# Patient Record
Sex: FEMALE | Race: WHITE | NOT HISPANIC OR LATINO
[De-identification: names, ages, dates, MRNs, and addresses within clinical notes are randomized per-mention and may not be internally consistent; named-entity substitution may affect disease eponyms.]

---

## 2017-06-09 ENCOUNTER — APPOINTMENT (OUTPATIENT)
Dept: ORTHOPEDIC SURGERY | Facility: CLINIC | Age: 72
End: 2017-06-09

## 2017-06-09 VITALS
HEIGHT: 60 IN | HEART RATE: 90 BPM | SYSTOLIC BLOOD PRESSURE: 155 MMHG | DIASTOLIC BLOOD PRESSURE: 87 MMHG | WEIGHT: 195 LBS | BODY MASS INDEX: 38.28 KG/M2

## 2017-06-09 RX ORDER — BENZONATATE 100 MG/1
100 CAPSULE ORAL
Qty: 21 | Refills: 0 | Status: ACTIVE | COMMUNITY
Start: 2017-03-25

## 2017-06-09 RX ORDER — AZITHROMYCIN 250 MG/1
250 TABLET, FILM COATED ORAL
Qty: 6 | Refills: 0 | Status: ACTIVE | COMMUNITY
Start: 2017-03-25

## 2017-06-09 RX ORDER — BENZONATATE 200 MG/1
200 CAPSULE ORAL
Qty: 21 | Refills: 0 | Status: ACTIVE | COMMUNITY
Start: 2017-03-21

## 2017-06-09 RX ORDER — FLUTICASONE PROPIONATE 50 UG/1
50 SPRAY, METERED NASAL
Qty: 16 | Refills: 0 | Status: ACTIVE | COMMUNITY
Start: 2017-03-21

## 2017-07-07 ENCOUNTER — APPOINTMENT (OUTPATIENT)
Dept: ORTHOPEDIC SURGERY | Facility: CLINIC | Age: 72
End: 2017-07-07

## 2017-07-07 VITALS
HEART RATE: 82 BPM | WEIGHT: 195 LBS | HEIGHT: 60 IN | DIASTOLIC BLOOD PRESSURE: 87 MMHG | SYSTOLIC BLOOD PRESSURE: 149 MMHG | BODY MASS INDEX: 38.28 KG/M2

## 2017-09-08 ENCOUNTER — APPOINTMENT (OUTPATIENT)
Dept: ORTHOPEDIC SURGERY | Facility: CLINIC | Age: 72
End: 2017-09-08
Payer: MEDICARE

## 2017-09-08 VITALS
BODY MASS INDEX: 38.28 KG/M2 | DIASTOLIC BLOOD PRESSURE: 90 MMHG | SYSTOLIC BLOOD PRESSURE: 154 MMHG | HEART RATE: 96 BPM | HEIGHT: 60 IN | WEIGHT: 195 LBS

## 2017-09-08 DIAGNOSIS — M17.12 UNILATERAL PRIMARY OSTEOARTHRITIS, LEFT KNEE: ICD-10-CM

## 2017-09-08 PROCEDURE — 99214 OFFICE O/P EST MOD 30 MIN: CPT | Mod: 25

## 2017-09-08 PROCEDURE — 20610 DRAIN/INJ JOINT/BURSA W/O US: CPT | Mod: LT

## 2018-01-19 ENCOUNTER — APPOINTMENT (OUTPATIENT)
Dept: ORTHOPEDIC SURGERY | Facility: CLINIC | Age: 73
End: 2018-01-19

## 2018-06-08 ENCOUNTER — APPOINTMENT (OUTPATIENT)
Dept: ORTHOPEDIC SURGERY | Facility: CLINIC | Age: 73
End: 2018-06-08

## 2018-06-29 PROBLEM — M25.572 LEFT ANKLE PAIN, UNSPECIFIED CHRONICITY: Status: ACTIVE | Noted: 2018-06-29

## 2018-07-06 ENCOUNTER — APPOINTMENT (OUTPATIENT)
Dept: ORTHOPEDIC SURGERY | Facility: CLINIC | Age: 73
End: 2018-07-06
Payer: MEDICARE

## 2018-07-06 VITALS
HEART RATE: 68 BPM | WEIGHT: 195 LBS | BODY MASS INDEX: 38.28 KG/M2 | SYSTOLIC BLOOD PRESSURE: 156 MMHG | HEIGHT: 60 IN | DIASTOLIC BLOOD PRESSURE: 88 MMHG

## 2018-07-06 DIAGNOSIS — M25.572 PAIN IN LEFT ANKLE AND JOINTS OF LEFT FOOT: ICD-10-CM

## 2018-07-06 DIAGNOSIS — M25.562 PAIN IN LEFT KNEE: ICD-10-CM

## 2018-07-06 PROCEDURE — 73562 X-RAY EXAM OF KNEE 3: CPT | Mod: LT

## 2018-07-06 PROCEDURE — 99214 OFFICE O/P EST MOD 30 MIN: CPT

## 2018-07-06 PROCEDURE — 73610 X-RAY EXAM OF ANKLE: CPT | Mod: RT

## 2018-07-10 ENCOUNTER — OUTPATIENT (OUTPATIENT)
Dept: OUTPATIENT SERVICES | Facility: HOSPITAL | Age: 73
LOS: 1 days | End: 2018-07-10
Payer: MEDICARE

## 2018-07-10 VITALS
OXYGEN SATURATION: 96 % | TEMPERATURE: 98 F | HEART RATE: 58 BPM | RESPIRATION RATE: 18 BRPM | DIASTOLIC BLOOD PRESSURE: 76 MMHG | SYSTOLIC BLOOD PRESSURE: 143 MMHG | WEIGHT: 212.08 LBS | HEIGHT: 60 IN

## 2018-07-10 DIAGNOSIS — Z01.818 ENCOUNTER FOR OTHER PREPROCEDURAL EXAMINATION: ICD-10-CM

## 2018-07-10 DIAGNOSIS — Z98.84 BARIATRIC SURGERY STATUS: Chronic | ICD-10-CM

## 2018-07-10 DIAGNOSIS — Z96.661 PRESENCE OF RIGHT ARTIFICIAL ANKLE JOINT: Chronic | ICD-10-CM

## 2018-07-10 DIAGNOSIS — M17.12 UNILATERAL PRIMARY OSTEOARTHRITIS, LEFT KNEE: ICD-10-CM

## 2018-07-10 DIAGNOSIS — Z90.89 ACQUIRED ABSENCE OF OTHER ORGANS: Chronic | ICD-10-CM

## 2018-07-10 LAB
ALBUMIN SERPL ELPH-MCNC: 3.9 G/DL — SIGNIFICANT CHANGE UP (ref 3.3–5)
ALP SERPL-CCNC: 81 U/L — SIGNIFICANT CHANGE UP (ref 30–120)
ALT FLD-CCNC: 29 U/L DA — SIGNIFICANT CHANGE UP (ref 10–60)
ANION GAP SERPL CALC-SCNC: 7 MMOL/L — SIGNIFICANT CHANGE UP (ref 5–17)
APTT BLD: 37.5 SEC — HIGH (ref 27.5–37.4)
AST SERPL-CCNC: 22 U/L — SIGNIFICANT CHANGE UP (ref 10–40)
BILIRUB SERPL-MCNC: 0.5 MG/DL — SIGNIFICANT CHANGE UP (ref 0.2–1.2)
BLD GP AB SCN SERPL QL: SIGNIFICANT CHANGE UP
BUN SERPL-MCNC: 21 MG/DL — SIGNIFICANT CHANGE UP (ref 7–23)
CALCIUM SERPL-MCNC: 9.8 MG/DL — SIGNIFICANT CHANGE UP (ref 8.4–10.5)
CHLORIDE SERPL-SCNC: 105 MMOL/L — SIGNIFICANT CHANGE UP (ref 96–108)
CO2 SERPL-SCNC: 27 MMOL/L — SIGNIFICANT CHANGE UP (ref 22–31)
CREAT SERPL-MCNC: 0.97 MG/DL — SIGNIFICANT CHANGE UP (ref 0.5–1.3)
GLUCOSE SERPL-MCNC: 107 MG/DL — HIGH (ref 70–99)
HCT VFR BLD CALC: 42.5 % — SIGNIFICANT CHANGE UP (ref 34.5–45)
HGB BLD-MCNC: 14.8 G/DL — SIGNIFICANT CHANGE UP (ref 11.5–15.5)
INR BLD: 1.03 RATIO — SIGNIFICANT CHANGE UP (ref 0.88–1.16)
MCHC RBC-ENTMCNC: 30.4 PG — SIGNIFICANT CHANGE UP (ref 27–34)
MCHC RBC-ENTMCNC: 34.8 GM/DL — SIGNIFICANT CHANGE UP (ref 32–36)
MCV RBC AUTO: 87.3 FL — SIGNIFICANT CHANGE UP (ref 80–100)
MRSA PCR RESULT.: SIGNIFICANT CHANGE UP
NRBC # BLD: 0 /100 WBCS — SIGNIFICANT CHANGE UP (ref 0–0)
PLATELET # BLD AUTO: 171 K/UL — SIGNIFICANT CHANGE UP (ref 150–400)
POTASSIUM SERPL-MCNC: 4.5 MMOL/L — SIGNIFICANT CHANGE UP (ref 3.5–5.3)
POTASSIUM SERPL-SCNC: 4.5 MMOL/L — SIGNIFICANT CHANGE UP (ref 3.5–5.3)
PROT SERPL-MCNC: 7.3 G/DL — SIGNIFICANT CHANGE UP (ref 6–8.3)
PROTHROM AB SERPL-ACNC: 11.2 SEC — SIGNIFICANT CHANGE UP (ref 9.8–12.7)
RBC # BLD: 4.87 M/UL — SIGNIFICANT CHANGE UP (ref 3.8–5.2)
RBC # FLD: 11.9 % — SIGNIFICANT CHANGE UP (ref 10.3–14.5)
S AUREUS DNA NOSE QL NAA+PROBE: SIGNIFICANT CHANGE UP
SODIUM SERPL-SCNC: 139 MMOL/L — SIGNIFICANT CHANGE UP (ref 135–145)
WBC # BLD: 6.44 K/UL — SIGNIFICANT CHANGE UP (ref 3.8–10.5)
WBC # FLD AUTO: 6.44 K/UL — SIGNIFICANT CHANGE UP (ref 3.8–10.5)

## 2018-07-10 PROCEDURE — 93010 ELECTROCARDIOGRAM REPORT: CPT | Mod: NC

## 2018-07-10 PROCEDURE — 93005 ELECTROCARDIOGRAM TRACING: CPT

## 2018-07-10 PROCEDURE — G0463: CPT

## 2018-07-10 PROCEDURE — 86901 BLOOD TYPING SEROLOGIC RH(D): CPT

## 2018-07-10 PROCEDURE — 85610 PROTHROMBIN TIME: CPT

## 2018-07-10 PROCEDURE — 80053 COMPREHEN METABOLIC PANEL: CPT

## 2018-07-10 PROCEDURE — 87640 STAPH A DNA AMP PROBE: CPT

## 2018-07-10 PROCEDURE — 86900 BLOOD TYPING SEROLOGIC ABO: CPT

## 2018-07-10 PROCEDURE — 86850 RBC ANTIBODY SCREEN: CPT

## 2018-07-10 PROCEDURE — 87641 MR-STAPH DNA AMP PROBE: CPT

## 2018-07-10 PROCEDURE — 85027 COMPLETE CBC AUTOMATED: CPT

## 2018-07-10 PROCEDURE — 85730 THROMBOPLASTIN TIME PARTIAL: CPT

## 2018-07-10 NOTE — H&P PST ADULT - HISTORY OF PRESENT ILLNESS
71 y/o female with h/o osteoarthritis present with c/o left knee pain and decreased range of motion. Patient reports pain level 10/10 on painscale with activities. Denies injury or trauma. Denies numbness or tingling, loss of sensation or weakness. Tried pain medication, gel and steroid injections with no relief. Doctor advised surgery. Patient here for PST in [facility].

## 2018-07-10 NOTE — H&P PST ADULT - PSH
History of laparoscopic adjustable gastric banding  s/p removal 8/2016  History of tonsillectomy  childhood  Personal history of gastric banding  2008  S/P ankle joint replacement, right  2012

## 2018-07-10 NOTE — H&P PST ADULT - NSANTHOSAYNRD_GEN_A_CORE
No. TORI screening performed.  STOP BANG Legend: 0-2 = LOW Risk; 3-4 = INTERMEDIATE Risk; 5-8 = HIGH Risk

## 2018-07-10 NOTE — H&P PST ADULT - FAMILY HISTORY
Mother  Still living? Unknown  Family history of lung cancer, Age at diagnosis: Age Unknown     Sibling  Still living? No  Family history of lung cancer, Age at diagnosis: Age Unknown  No family history of COPD, Age at diagnosis: Age Unknown

## 2018-07-17 PROBLEM — M17.12 UNILATERAL PRIMARY OSTEOARTHRITIS, LEFT KNEE: Chronic | Status: ACTIVE | Noted: 2018-07-10

## 2018-07-19 ENCOUNTER — INPATIENT (INPATIENT)
Facility: HOSPITAL | Age: 73
LOS: 1 days | Discharge: HOME | DRG: 470 | End: 2018-07-21
Attending: ORTHOPAEDIC SURGERY | Admitting: ORTHOPAEDIC SURGERY
Payer: MEDICARE

## 2018-07-19 ENCOUNTER — RESULT REVIEW (OUTPATIENT)
Age: 73
End: 2018-07-19

## 2018-07-19 ENCOUNTER — APPOINTMENT (OUTPATIENT)
Dept: ORTHOPEDIC SURGERY | Facility: HOSPITAL | Age: 73
End: 2018-07-19

## 2018-07-19 VITALS
WEIGHT: 211.2 LBS | HEIGHT: 60 IN | SYSTOLIC BLOOD PRESSURE: 137 MMHG | RESPIRATION RATE: 18 BRPM | OXYGEN SATURATION: 97 % | TEMPERATURE: 98 F | HEART RATE: 75 BPM | DIASTOLIC BLOOD PRESSURE: 64 MMHG

## 2018-07-19 DIAGNOSIS — Z98.84 BARIATRIC SURGERY STATUS: Chronic | ICD-10-CM

## 2018-07-19 DIAGNOSIS — M17.12 UNILATERAL PRIMARY OSTEOARTHRITIS, LEFT KNEE: ICD-10-CM

## 2018-07-19 DIAGNOSIS — Z96.661 PRESENCE OF RIGHT ARTIFICIAL ANKLE JOINT: Chronic | ICD-10-CM

## 2018-07-19 DIAGNOSIS — Z90.89 ACQUIRED ABSENCE OF OTHER ORGANS: Chronic | ICD-10-CM

## 2018-07-19 DIAGNOSIS — Z01.818 ENCOUNTER FOR OTHER PREPROCEDURAL EXAMINATION: ICD-10-CM

## 2018-07-19 LAB
ANION GAP SERPL CALC-SCNC: 8 MMOL/L — SIGNIFICANT CHANGE UP (ref 5–17)
BUN SERPL-MCNC: 27 MG/DL — HIGH (ref 7–23)
CALCIUM SERPL-MCNC: 9.1 MG/DL — SIGNIFICANT CHANGE UP (ref 8.4–10.5)
CHLORIDE SERPL-SCNC: 103 MMOL/L — SIGNIFICANT CHANGE UP (ref 96–108)
CO2 SERPL-SCNC: 24 MMOL/L — SIGNIFICANT CHANGE UP (ref 22–31)
CREAT SERPL-MCNC: 1.13 MG/DL — SIGNIFICANT CHANGE UP (ref 0.5–1.3)
GLUCOSE SERPL-MCNC: 200 MG/DL — HIGH (ref 70–99)
HCT VFR BLD CALC: 39.8 % — SIGNIFICANT CHANGE UP (ref 34.5–45)
HGB BLD-MCNC: 14 G/DL — SIGNIFICANT CHANGE UP (ref 11.5–15.5)
POTASSIUM SERPL-MCNC: 4 MMOL/L — SIGNIFICANT CHANGE UP (ref 3.5–5.3)
POTASSIUM SERPL-SCNC: 4 MMOL/L — SIGNIFICANT CHANGE UP (ref 3.5–5.3)
SODIUM SERPL-SCNC: 135 MMOL/L — SIGNIFICANT CHANGE UP (ref 135–145)

## 2018-07-19 PROCEDURE — 27447 TOTAL KNEE ARTHROPLASTY: CPT | Mod: LT

## 2018-07-19 PROCEDURE — 88311 DECALCIFY TISSUE: CPT | Mod: 26

## 2018-07-19 PROCEDURE — 73562 X-RAY EXAM OF KNEE 3: CPT | Mod: 26,LT

## 2018-07-19 PROCEDURE — 88305 TISSUE EXAM BY PATHOLOGIST: CPT | Mod: 26

## 2018-07-19 PROCEDURE — 99223 1ST HOSP IP/OBS HIGH 75: CPT

## 2018-07-19 RX ORDER — SODIUM CHLORIDE 9 MG/ML
1000 INJECTION, SOLUTION INTRAVENOUS
Qty: 0 | Refills: 0 | Status: DISCONTINUED | OUTPATIENT
Start: 2018-07-19 | End: 2018-07-20

## 2018-07-19 RX ORDER — MAGNESIUM HYDROXIDE 400 MG/1
30 TABLET, CHEWABLE ORAL DAILY
Qty: 0 | Refills: 0 | Status: DISCONTINUED | OUTPATIENT
Start: 2018-07-19 | End: 2018-07-21

## 2018-07-19 RX ORDER — CELECOXIB 200 MG/1
200 CAPSULE ORAL
Qty: 0 | Refills: 0 | Status: DISCONTINUED | OUTPATIENT
Start: 2018-07-19 | End: 2018-07-20

## 2018-07-19 RX ORDER — SIMVASTATIN 20 MG/1
10 TABLET, FILM COATED ORAL AT BEDTIME
Qty: 0 | Refills: 0 | Status: DISCONTINUED | OUTPATIENT
Start: 2018-07-19 | End: 2018-07-21

## 2018-07-19 RX ORDER — POLYETHYLENE GLYCOL 3350 17 G/17G
17 POWDER, FOR SOLUTION ORAL DAILY
Qty: 0 | Refills: 0 | Status: DISCONTINUED | OUTPATIENT
Start: 2018-07-19 | End: 2018-07-21

## 2018-07-19 RX ORDER — ONDANSETRON 8 MG/1
4 TABLET, FILM COATED ORAL ONCE
Qty: 0 | Refills: 0 | Status: COMPLETED | OUTPATIENT
Start: 2018-07-19 | End: 2018-07-19

## 2018-07-19 RX ORDER — ACETAMINOPHEN 500 MG
1000 TABLET ORAL EVERY 6 HOURS
Qty: 0 | Refills: 0 | Status: COMPLETED | OUTPATIENT
Start: 2018-07-19 | End: 2018-07-20

## 2018-07-19 RX ORDER — DOCUSATE SODIUM 100 MG
100 CAPSULE ORAL THREE TIMES A DAY
Qty: 0 | Refills: 0 | Status: DISCONTINUED | OUTPATIENT
Start: 2018-07-19 | End: 2018-07-21

## 2018-07-19 RX ORDER — PANTOPRAZOLE SODIUM 20 MG/1
40 TABLET, DELAYED RELEASE ORAL
Qty: 0 | Refills: 0 | Status: DISCONTINUED | OUTPATIENT
Start: 2018-07-19 | End: 2018-07-21

## 2018-07-19 RX ORDER — HYDROMORPHONE HYDROCHLORIDE 2 MG/ML
0.5 INJECTION INTRAMUSCULAR; INTRAVENOUS; SUBCUTANEOUS
Qty: 0 | Refills: 0 | Status: DISCONTINUED | OUTPATIENT
Start: 2018-07-19 | End: 2018-07-21

## 2018-07-19 RX ORDER — SIMVASTATIN 20 MG/1
10 TABLET, FILM COATED ORAL AT BEDTIME
Qty: 0 | Refills: 0 | Status: DISCONTINUED | OUTPATIENT
Start: 2018-07-19 | End: 2018-07-19

## 2018-07-19 RX ORDER — APREPITANT 80 MG/1
40 CAPSULE ORAL ONCE
Qty: 0 | Refills: 0 | Status: COMPLETED | OUTPATIENT
Start: 2018-07-19 | End: 2018-07-19

## 2018-07-19 RX ORDER — TRANEXAMIC ACID 100 MG/ML
1000 INJECTION, SOLUTION INTRAVENOUS ONCE
Qty: 0 | Refills: 0 | Status: COMPLETED | OUTPATIENT
Start: 2018-07-19 | End: 2018-07-19

## 2018-07-19 RX ORDER — OXYCODONE HYDROCHLORIDE 5 MG/1
5 TABLET ORAL
Qty: 0 | Refills: 0 | Status: DISCONTINUED | OUTPATIENT
Start: 2018-07-19 | End: 2018-07-21

## 2018-07-19 RX ORDER — ONDANSETRON 8 MG/1
4 TABLET, FILM COATED ORAL EVERY 6 HOURS
Qty: 0 | Refills: 0 | Status: DISCONTINUED | OUTPATIENT
Start: 2018-07-19 | End: 2018-07-21

## 2018-07-19 RX ORDER — OXYCODONE HYDROCHLORIDE 5 MG/1
10 TABLET ORAL
Qty: 0 | Refills: 0 | Status: DISCONTINUED | OUTPATIENT
Start: 2018-07-19 | End: 2018-07-21

## 2018-07-19 RX ORDER — SODIUM CHLORIDE 9 MG/ML
1000 INJECTION, SOLUTION INTRAVENOUS
Qty: 0 | Refills: 0 | Status: DISCONTINUED | OUTPATIENT
Start: 2018-07-19 | End: 2018-07-19

## 2018-07-19 RX ORDER — NEBIVOLOL HYDROCHLORIDE 5 MG/1
10 TABLET ORAL DAILY
Qty: 0 | Refills: 0 | Status: DISCONTINUED | OUTPATIENT
Start: 2018-07-19 | End: 2018-07-21

## 2018-07-19 RX ORDER — ACETAMINOPHEN 500 MG
1000 TABLET ORAL EVERY 8 HOURS
Qty: 0 | Refills: 0 | Status: DISCONTINUED | OUTPATIENT
Start: 2018-07-20 | End: 2018-07-21

## 2018-07-19 RX ORDER — HYDROMORPHONE HYDROCHLORIDE 2 MG/ML
0.5 INJECTION INTRAMUSCULAR; INTRAVENOUS; SUBCUTANEOUS
Qty: 0 | Refills: 0 | Status: DISCONTINUED | OUTPATIENT
Start: 2018-07-19 | End: 2018-07-19

## 2018-07-19 RX ORDER — VANCOMYCIN HCL 1 G
1500 VIAL (EA) INTRAVENOUS ONCE
Qty: 0 | Refills: 0 | Status: COMPLETED | OUTPATIENT
Start: 2018-07-19 | End: 2018-07-19

## 2018-07-19 RX ORDER — ACETAMINOPHEN 500 MG
1000 TABLET ORAL ONCE
Qty: 0 | Refills: 0 | Status: COMPLETED | OUTPATIENT
Start: 2018-07-19 | End: 2018-07-19

## 2018-07-19 RX ORDER — ASPIRIN/CALCIUM CARB/MAGNESIUM 324 MG
162 TABLET ORAL EVERY 12 HOURS
Qty: 0 | Refills: 0 | Status: DISCONTINUED | OUTPATIENT
Start: 2018-07-20 | End: 2018-07-21

## 2018-07-19 RX ORDER — CHLORHEXIDINE GLUCONATE 213 G/1000ML
1 SOLUTION TOPICAL ONCE
Qty: 0 | Refills: 0 | Status: COMPLETED | OUTPATIENT
Start: 2018-07-19 | End: 2018-07-19

## 2018-07-19 RX ORDER — SENNA PLUS 8.6 MG/1
2 TABLET ORAL AT BEDTIME
Qty: 0 | Refills: 0 | Status: DISCONTINUED | OUTPATIENT
Start: 2018-07-19 | End: 2018-07-21

## 2018-07-19 RX ORDER — LISINOPRIL 2.5 MG/1
10 TABLET ORAL DAILY
Qty: 0 | Refills: 0 | Status: DISCONTINUED | OUTPATIENT
Start: 2018-07-21 | End: 2018-07-21

## 2018-07-19 RX ADMIN — SODIUM CHLORIDE 100 MILLILITER(S): 9 INJECTION, SOLUTION INTRAVENOUS at 16:37

## 2018-07-19 RX ADMIN — OXYCODONE HYDROCHLORIDE 5 MILLIGRAM(S): 5 TABLET ORAL at 23:51

## 2018-07-19 RX ADMIN — Medication 200 MILLIGRAM(S): at 16:37

## 2018-07-19 RX ADMIN — SIMVASTATIN 10 MILLIGRAM(S): 20 TABLET, FILM COATED ORAL at 21:16

## 2018-07-19 RX ADMIN — APREPITANT 40 MILLIGRAM(S): 80 CAPSULE ORAL at 07:25

## 2018-07-19 RX ADMIN — HYDROMORPHONE HYDROCHLORIDE 0.5 MILLIGRAM(S): 2 INJECTION INTRAMUSCULAR; INTRAVENOUS; SUBCUTANEOUS at 12:14

## 2018-07-19 RX ADMIN — Medication 300 MILLIGRAM(S): at 20:02

## 2018-07-19 RX ADMIN — CHLORHEXIDINE GLUCONATE 1 APPLICATION(S): 213 SOLUTION TOPICAL at 07:26

## 2018-07-19 RX ADMIN — HYDROMORPHONE HYDROCHLORIDE 0.5 MILLIGRAM(S): 2 INJECTION INTRAMUSCULAR; INTRAVENOUS; SUBCUTANEOUS at 12:59

## 2018-07-19 RX ADMIN — OXYCODONE HYDROCHLORIDE 5 MILLIGRAM(S): 5 TABLET ORAL at 20:02

## 2018-07-19 RX ADMIN — Medication 400 MILLIGRAM(S): at 16:44

## 2018-07-19 RX ADMIN — HYDROMORPHONE HYDROCHLORIDE 0.5 MILLIGRAM(S): 2 INJECTION INTRAMUSCULAR; INTRAVENOUS; SUBCUTANEOUS at 13:37

## 2018-07-19 RX ADMIN — Medication 100 MILLIGRAM(S): at 21:16

## 2018-07-19 RX ADMIN — SODIUM CHLORIDE 100 MILLILITER(S): 9 INJECTION, SOLUTION INTRAVENOUS at 12:30

## 2018-07-19 RX ADMIN — Medication 400 MILLIGRAM(S): at 22:15

## 2018-07-19 RX ADMIN — ONDANSETRON 4 MILLIGRAM(S): 8 TABLET, FILM COATED ORAL at 13:30

## 2018-07-19 RX ADMIN — HYDROMORPHONE HYDROCHLORIDE 0.5 MILLIGRAM(S): 2 INJECTION INTRAMUSCULAR; INTRAVENOUS; SUBCUTANEOUS at 12:44

## 2018-07-19 RX ADMIN — OXYCODONE HYDROCHLORIDE 5 MILLIGRAM(S): 5 TABLET ORAL at 21:00

## 2018-07-19 RX ADMIN — Medication 1000 MILLIGRAM(S): at 22:15

## 2018-07-19 RX ADMIN — HYDROMORPHONE HYDROCHLORIDE 0.5 MILLIGRAM(S): 2 INJECTION INTRAMUSCULAR; INTRAVENOUS; SUBCUTANEOUS at 13:04

## 2018-07-19 NOTE — BRIEF OPERATIVE NOTE - PROCEDURE
<<-----Click on this checkbox to enter Procedure Knee replacement  07/19/2018  Left knee replacement  Active  [email or web address]

## 2018-07-19 NOTE — OCCUPATIONAL THERAPY INITIAL EVALUATION ADULT - BED MOBILITY TRAINING, PT EVAL
Pt will perform bed mobility with supervision within 3-5 sessions. Pt will perform bed mobility I'ly within 3-5 sessions.

## 2018-07-19 NOTE — CONSULT NOTE ADULT - SUBJECTIVE AND OBJECTIVE BOX
Patient is a 72y old  Female who presents with a chief complaint of I have left knee pain (2018 16:48)        HPI: primary severe osteoarthritis of joint s/p left tkr.  pain controlled.    HTN - controlled with meds    morbid obesity s/p lap band removal       PAST MEDICAL & SURGICAL HISTORY:  Primary osteoarthritis of left knee  Obesity (BMI 30-39.9)  Hypercholesteremia  Hypertension  History of laparoscopic adjustable gastric banding: s/p removal 2016  History of tonsillectomy: childhood  Personal history of gastric bandin  S/P ankle joint replacement, right:       REVIEW OF SYSTEMS:    negative unless otherwise specified in HPI.      MEDICATIONS  (STANDING):  acetaminophen  IVPB. 1000 milliGRAM(s) IV Intermittent every 6 hours  celecoxib 200 milliGRAM(s) Oral two times a day  docusate sodium 100 milliGRAM(s) Oral three times a day  lactated ringers. 1000 milliLiter(s) (100 mL/Hr) IV Continuous <Continuous>  nebivolol 10 milliGRAM(s) Oral daily  pantoprazole    Tablet 40 milliGRAM(s) Oral before breakfast  simvastatin 10 milliGRAM(s) Oral at bedtime  vancomycin  IVPB 1500 milliGRAM(s) IV Intermittent once    MEDICATIONS  (PRN):  aluminum hydroxide/magnesium hydroxide/simethicone Suspension 30 milliLiter(s) Oral four times a day PRN Indigestion  HYDROmorphone  Injectable 0.5 milliGRAM(s) IV Push every 3 hours PRN Severe Pain (7 - 10)  magnesium hydroxide Suspension 30 milliLiter(s) Oral daily PRN Constipation  ondansetron Injectable 4 milliGRAM(s) IV Push every 6 hours PRN Nausea and/or Vomiting  oxyCODONE    IR 5 milliGRAM(s) Oral every 3 hours PRN Mild Pain (1 - 3)  oxyCODONE    IR 10 milliGRAM(s) Oral every 3 hours PRN Moderate Pain (4 - 6)  polyethylene glycol 3350 17 Gram(s) Oral daily PRN Constipation  promethazine IVPB 6.25 milliGRAM(s) IV Intermittent every 6 hours PRN nausea or vomiting  senna 2 Tablet(s) Oral at bedtime PRN Constipation      Allergies    penicillin (Hives)    Intolerances        SOCIAL HISTORY: no toxic habits    FAMILY HISTORY:  No family history of COPD (Sibling)  Family history of lung cancer (Mother, Sibling)      Vital Signs Last 24 Hrs  T(C): 36.1 (2018 16:19), Max: 36.8 (2018 07:00)  T(F): 97 (2018 16:19), Max: 98.2 (2018 07:00)  HR: 84 (2018 16:19) (70 - 88)  BP: 139/65 (2018 16:19) (98/56 - 141/80)  BP(mean): --  RR: 16 (2018 16:19) (9 - 22)  SpO2: 96% (2018 16:19) (93% - 99%)    PHYSICAL EXAM:  GENERAL: No apparent distress  HEAD:  Atraumatic, Normocephalic  EYES: conjunctiva and sclera clear  ENMT: Moist mucous membranes  NECK: Supple  CHEST/LUNG: Clear to auscultation; no rales/wheeze or rubs  HEART: Regular rate and rhythm, no murmurs, rubs or gallops  ABDOMEN: Soft, Nontender, Nondistended; Bowel sounds present, OBESE  EXTREMITIES:  No clubbing, cyanosis or edema  SKIN: No rashes or lesions  NERVOUS SYSTEM:  Alert & Oriented X3; Bilateral lower extremity mobile, sensation to light touch intact  INCISION:  Dressing dry and intact    LABS:  Hematocrit: 39.8 % ( @ 16:02)  Hemoglobin: 14.0 g/dL ( @ 16:02)          135  |  103  |  27<H>  ----------------------------<  200<H>  4.0   |  24  |  1.13    Ca    9.1      2018 16:02                                        IMAGING: imaging reviewed personally    Consultant Notes Reviewed and Care Discussed with relevant Consultants/Other Providers.

## 2018-07-19 NOTE — OCCUPATIONAL THERAPY INITIAL EVALUATION ADULT - ADDITIONAL COMMENTS
pt lives with spouse in a private house no steps. + stall shower pt owns a RTS  reports spouse can assist when needed

## 2018-07-19 NOTE — CONSULT NOTE ADULT - ASSESSMENT
72 female    1. left tkr: opiates prn + bowel regimen.   Physical Therapy evaluation.  Discussed with orthopedic service Physician Assistant.     2. Morbid obesity s/p lap band removal 2016: telemonitor overnight    3. HTN: Blood pressure meds with hold parameters     4. dvt prophylaxis per orthopedic protocol     5. dispo: home in 2 days.  d/w RN plan of care

## 2018-07-20 ENCOUNTER — TRANSCRIPTION ENCOUNTER (OUTPATIENT)
Age: 73
End: 2018-07-20

## 2018-07-20 LAB
ANION GAP SERPL CALC-SCNC: 5 MMOL/L — SIGNIFICANT CHANGE UP (ref 5–17)
BUN SERPL-MCNC: 27 MG/DL — HIGH (ref 7–23)
CALCIUM SERPL-MCNC: 9 MG/DL — SIGNIFICANT CHANGE UP (ref 8.4–10.5)
CHLORIDE SERPL-SCNC: 103 MMOL/L — SIGNIFICANT CHANGE UP (ref 96–108)
CO2 SERPL-SCNC: 25 MMOL/L — SIGNIFICANT CHANGE UP (ref 22–31)
CREAT SERPL-MCNC: 1.2 MG/DL — SIGNIFICANT CHANGE UP (ref 0.5–1.3)
GLUCOSE SERPL-MCNC: 131 MG/DL — HIGH (ref 70–99)
HCT VFR BLD CALC: 35.4 % — SIGNIFICANT CHANGE UP (ref 34.5–45)
HGB BLD-MCNC: 12.4 G/DL — SIGNIFICANT CHANGE UP (ref 11.5–15.5)
MCHC RBC-ENTMCNC: 30.2 PG — SIGNIFICANT CHANGE UP (ref 27–34)
MCHC RBC-ENTMCNC: 35 GM/DL — SIGNIFICANT CHANGE UP (ref 32–36)
MCV RBC AUTO: 86.3 FL — SIGNIFICANT CHANGE UP (ref 80–100)
NRBC # BLD: 0 /100 WBCS — SIGNIFICANT CHANGE UP (ref 0–0)
PLATELET # BLD AUTO: 153 K/UL — SIGNIFICANT CHANGE UP (ref 150–400)
POTASSIUM SERPL-MCNC: 4.5 MMOL/L — SIGNIFICANT CHANGE UP (ref 3.5–5.3)
POTASSIUM SERPL-SCNC: 4.5 MMOL/L — SIGNIFICANT CHANGE UP (ref 3.5–5.3)
RBC # BLD: 4.1 M/UL — SIGNIFICANT CHANGE UP (ref 3.8–5.2)
RBC # FLD: 12 % — SIGNIFICANT CHANGE UP (ref 10.3–14.5)
SODIUM SERPL-SCNC: 133 MMOL/L — LOW (ref 135–145)
WBC # BLD: 11.43 K/UL — HIGH (ref 3.8–10.5)
WBC # FLD AUTO: 11.43 K/UL — HIGH (ref 3.8–10.5)

## 2018-07-20 PROCEDURE — 99233 SBSQ HOSP IP/OBS HIGH 50: CPT

## 2018-07-20 RX ORDER — AMLODIPINE BESYLATE 2.5 MG/1
1 TABLET ORAL
Qty: 0 | Refills: 0 | COMMUNITY

## 2018-07-20 RX ORDER — ACETAMINOPHEN 500 MG
2 TABLET ORAL
Qty: 0 | Refills: 0 | COMMUNITY
Start: 2018-07-20

## 2018-07-20 RX ORDER — PANTOPRAZOLE SODIUM 20 MG/1
1 TABLET, DELAYED RELEASE ORAL
Qty: 40 | Refills: 0 | OUTPATIENT
Start: 2018-07-20 | End: 2018-08-28

## 2018-07-20 RX ORDER — FAMOTIDINE 10 MG/ML
0 INJECTION INTRAVENOUS
Qty: 0 | Refills: 0 | COMMUNITY

## 2018-07-20 RX ORDER — SENNA PLUS 8.6 MG/1
2 TABLET ORAL
Qty: 0 | Refills: 0 | COMMUNITY
Start: 2018-07-20

## 2018-07-20 RX ORDER — SODIUM CHLORIDE 9 MG/ML
1000 INJECTION INTRAMUSCULAR; INTRAVENOUS; SUBCUTANEOUS
Qty: 0 | Refills: 0 | Status: DISCONTINUED | OUTPATIENT
Start: 2018-07-20 | End: 2018-07-21

## 2018-07-20 RX ORDER — CELECOXIB 200 MG/1
200 CAPSULE ORAL
Qty: 0 | Refills: 0 | Status: COMPLETED | OUTPATIENT
Start: 2018-07-20 | End: 2018-07-20

## 2018-07-20 RX ORDER — DOCUSATE SODIUM 100 MG
1 CAPSULE ORAL
Qty: 0 | Refills: 0 | COMMUNITY
Start: 2018-07-20

## 2018-07-20 RX ORDER — MELOXICAM 15 MG/1
15 TABLET ORAL DAILY
Qty: 0 | Refills: 0 | Status: DISCONTINUED | OUTPATIENT
Start: 2018-07-21 | End: 2018-07-21

## 2018-07-20 RX ORDER — MELOXICAM 15 MG/1
1 TABLET ORAL
Qty: 20 | Refills: 1 | OUTPATIENT
Start: 2018-07-20 | End: 2018-08-28

## 2018-07-20 RX ORDER — NEBIVOLOL HYDROCHLORIDE 5 MG/1
1 TABLET ORAL
Qty: 0 | Refills: 0 | COMMUNITY

## 2018-07-20 RX ORDER — CELECOXIB 200 MG/1
1 CAPSULE ORAL
Qty: 40 | Refills: 1 | OUTPATIENT
Start: 2018-07-20 | End: 2018-08-28

## 2018-07-20 RX ORDER — ASPIRIN/CALCIUM CARB/MAGNESIUM 324 MG
2 TABLET ORAL
Qty: 160 | Refills: 0 | OUTPATIENT
Start: 2018-07-20 | End: 2018-08-28

## 2018-07-20 RX ADMIN — OXYCODONE HYDROCHLORIDE 5 MILLIGRAM(S): 5 TABLET ORAL at 21:22

## 2018-07-20 RX ADMIN — CELECOXIB 200 MILLIGRAM(S): 200 CAPSULE ORAL at 17:42

## 2018-07-20 RX ADMIN — OXYCODONE HYDROCHLORIDE 5 MILLIGRAM(S): 5 TABLET ORAL at 16:49

## 2018-07-20 RX ADMIN — OXYCODONE HYDROCHLORIDE 5 MILLIGRAM(S): 5 TABLET ORAL at 13:30

## 2018-07-20 RX ADMIN — SIMVASTATIN 10 MILLIGRAM(S): 20 TABLET, FILM COATED ORAL at 20:53

## 2018-07-20 RX ADMIN — Medication 162 MILLIGRAM(S): at 20:52

## 2018-07-20 RX ADMIN — CELECOXIB 200 MILLIGRAM(S): 200 CAPSULE ORAL at 05:42

## 2018-07-20 RX ADMIN — OXYCODONE HYDROCHLORIDE 5 MILLIGRAM(S): 5 TABLET ORAL at 20:52

## 2018-07-20 RX ADMIN — Medication 1000 MILLIGRAM(S): at 17:43

## 2018-07-20 RX ADMIN — OXYCODONE HYDROCHLORIDE 5 MILLIGRAM(S): 5 TABLET ORAL at 12:54

## 2018-07-20 RX ADMIN — NEBIVOLOL HYDROCHLORIDE 10 MILLIGRAM(S): 5 TABLET ORAL at 07:13

## 2018-07-20 RX ADMIN — CELECOXIB 200 MILLIGRAM(S): 200 CAPSULE ORAL at 17:43

## 2018-07-20 RX ADMIN — OXYCODONE HYDROCHLORIDE 5 MILLIGRAM(S): 5 TABLET ORAL at 17:30

## 2018-07-20 RX ADMIN — PANTOPRAZOLE SODIUM 40 MILLIGRAM(S): 20 TABLET, DELAYED RELEASE ORAL at 05:41

## 2018-07-20 RX ADMIN — CELECOXIB 200 MILLIGRAM(S): 200 CAPSULE ORAL at 05:41

## 2018-07-20 RX ADMIN — Medication 1000 MILLIGRAM(S): at 03:23

## 2018-07-20 RX ADMIN — Medication 100 MILLIGRAM(S): at 20:52

## 2018-07-20 RX ADMIN — Medication 1000 MILLIGRAM(S): at 17:14

## 2018-07-20 RX ADMIN — OXYCODONE HYDROCHLORIDE 5 MILLIGRAM(S): 5 TABLET ORAL at 00:21

## 2018-07-20 RX ADMIN — Medication 100 MILLIGRAM(S): at 12:53

## 2018-07-20 RX ADMIN — Medication 400 MILLIGRAM(S): at 03:05

## 2018-07-20 RX ADMIN — OXYCODONE HYDROCHLORIDE 5 MILLIGRAM(S): 5 TABLET ORAL at 08:28

## 2018-07-20 RX ADMIN — OXYCODONE HYDROCHLORIDE 5 MILLIGRAM(S): 5 TABLET ORAL at 04:00

## 2018-07-20 RX ADMIN — OXYCODONE HYDROCHLORIDE 5 MILLIGRAM(S): 5 TABLET ORAL at 03:05

## 2018-07-20 RX ADMIN — OXYCODONE HYDROCHLORIDE 5 MILLIGRAM(S): 5 TABLET ORAL at 09:00

## 2018-07-20 RX ADMIN — Medication 100 MILLIGRAM(S): at 05:41

## 2018-07-20 RX ADMIN — Medication 162 MILLIGRAM(S): at 08:29

## 2018-07-20 NOTE — DISCHARGE NOTE ADULT - PATIENT PORTAL LINK FT
You can access the [facility] Portal, offered by [facility], by registering with the following website: [email or web address]

## 2018-07-20 NOTE — DISCHARGE NOTE ADULT - HOSPITAL COURSE
This patient was admitted to [facility] with a history of severe degenerative joint disease of the left knee.  Patient went to Pre-Surgical Testing at [facility] and was medically cleared to undergo elective procedure. Patient underwent left TKR by Dr. Brittney Michael on 7/19/18. Procedure was well tolerated.  No operative or mallory-operative complications arose during patients hospital course.  Patient received antibiotic according to SCIP guidelines for infection prevention.  Aspirin was given for DVT prophylaxis.  Anesthesia, Medical Hospitalist, Physical Therapy and Occupational Therapy were consulted. Patient is stable for discharge with a good prognosis.  Appropriate discharge instructions and medications are provided in this document.

## 2018-07-20 NOTE — DISCHARGE NOTE ADULT - CARE PLAN
Principal Discharge DX:	Primary osteoarthritis of left knee  Goal:	Improve ambulation, ADLs and quality of life  Assessment and plan of treatment:	Physical Therapy/Occupational Therapy for: ambulation, transfers, stairs, ADL's (activities of daily living), range of motion exercises, and isometrics  -Activity  • Weight Bearing as tolerated with rolling walker.  • Take short, frequent walks increasing the distance that you walk each day as tolerated.  • Change your position every hour to decrease pain and stiffness.  • Continue the exercises taught to you by your physical therapist.  • No driving until cleared by the doctor.  • No tub baths, hot tubs, or swimming pools until instructed by your doctor.  • Do not squat down on the floor.  • Do not kneel or twist your knee.  • Range of Motion Goals: Flexion= 120 degrees, Extension = 0 degrees  Keep incision clean and dry. May shower 5 days after surgery if no drainage from incision.  Prineo removal 2 weeks after surgery at Surgeon's office.  - Call your doctor if you experience:  • An increase in pain not controlled by pain medication or change in activity or position.  • Temperature greater than 101° F.  • Redness, increased swelling or foul smelling drainage from or around the incision.  • Numbness, tingling or a change in color or temperature of the operative leg.  • Call your doctor immediately if you experience chest pain, shortness of breath or calf pain.

## 2018-07-20 NOTE — DISCHARGE NOTE ADULT - PROVIDER TOKENS
FREE:[LAST:[Alec],FIRST:[Brittney],PHONE:[phone]],FAX:[(   [phone]    -],ADDRESS:[[address] [place] [address]]]

## 2018-07-20 NOTE — DISCHARGE NOTE ADULT - PLAN OF CARE
Improve ambulation, ADLs and quality of life Physical Therapy/Occupational Therapy for: ambulation, transfers, stairs, ADL's (activities of daily living), range of motion exercises, and isometrics  -Activity  • Weight Bearing as tolerated with rolling walker.  • Take short, frequent walks increasing the distance that you walk each day as tolerated.  • Change your position every hour to decrease pain and stiffness.  • Continue the exercises taught to you by your physical therapist.  • No driving until cleared by the doctor.  • No tub baths, hot tubs, or swimming pools until instructed by your doctor.  • Do not squat down on the floor.  • Do not kneel or twist your knee.  • Range of Motion Goals: Flexion= 120 degrees, Extension = 0 degrees  Keep incision clean and dry. May shower 5 days after surgery if no drainage from incision.  Prineo removal 2 weeks after surgery at Surgeon's office.  - Call your doctor if you experience:  • An increase in pain not controlled by pain medication or change in activity or position.  • Temperature greater than 101° F.  • Redness, increased swelling or foul smelling drainage from or around the incision.  • Numbness, tingling or a change in color or temperature of the operative leg.  • Call your doctor immediately if you experience chest pain, shortness of breath or calf pain.

## 2018-07-20 NOTE — DISCHARGE NOTE ADULT - INSTRUCTIONS
none  For Constipation :   • Increase your water intake. Drink at least 8 glasses of water daily.  • Try adding fiber to your diet by eating fruits, vegetables and foods that are rich in grains.  • If you do experience constipation, you may take an over-the-counter stool softener/laxative such as Anusha Colace, Senekot or  Milk of Magnesia.

## 2018-07-20 NOTE — DISCHARGE NOTE ADULT - DURABLE MEDICAL EQUIPMENT AGENCY
Formerly [facility], [facility]  Junior Stacie Hermosillo

## 2018-07-20 NOTE — DISCHARGE NOTE ADULT - MEDICATION SUMMARY - MEDICATIONS TO TAKE
I will START or STAY ON the medications listed below when I get home from the hospital:    petey walker  -- s/p left TKA  -- Indication: For assistive device    acetaminophen 500 mg oral tablet  -- 2 tab(s) by mouth every 8 hours for 2 to 3 weeks.  -- Indication: For Pain    aspirin 81 mg oral delayed release tablet  -- 2 tab(s) by mouth every 12 hours  -- Indication: For Prevention of blood clots    meloxicam 15 mg oral tablet  -- 1 tab(s) by mouth once a day   -- Do not take this drug if you are pregnant.  Obtain medical advice before taking any non-prescription drugs as some may affect the action of this medication.  Take with food or milk.    -- Indication: For Pain    oxyCODONE 5 mg oral tablet  -- 1-2 tab(s) by mouth every 3 hours, As needed, Mild Pain (1 - 3) [facility]:8  Reference #: [id number]  -- Indication: For Pain    fosinopril 10 mg oral tablet  -- 1 tab(s) by mouth once a day  -- Indication: For high blood pressure    simvastatin 10 mg oral tablet  -- 1 tab(s) by mouth once a day (at bedtime)  -- Indication: For high cholesterol    Bystolic 10 mg oral tablet  -- 1 tab(s) by mouth once a day  -- Indication: For high blood pressure    amLODIPine 5 mg oral tablet  -- 1 tab(s) by mouth once a day  -- Indication: For high blood pressure    docusate sodium 100 mg oral capsule  -- 1 cap(s) by mouth 3 times a day  -- Indication: For constipation    senna oral tablet  -- 2 tab(s) by mouth once a day (at bedtime)  -- Indication: For constipation    pantoprazole 40 mg oral delayed release tablet  -- 1 tab(s) by mouth once a day (before a meal)  -- Indication: For stomach protection

## 2018-07-20 NOTE — DISCHARGE NOTE ADULT - NS AS ACTIVITY OBS
Do not drive or operate machinery/Walking-Outdoors allowed/No Heavy lifting/straining/Walking-Indoors allowed/Stairs allowed

## 2018-07-20 NOTE — DISCHARGE NOTE ADULT - CARE PROVIDER_API CALL
Brittney Michael  [address] [place] [id number]  Phone: [phone]  Fax: (   )    -

## 2018-07-21 VITALS
DIASTOLIC BLOOD PRESSURE: 67 MMHG | OXYGEN SATURATION: 97 % | HEART RATE: 70 BPM | SYSTOLIC BLOOD PRESSURE: 120 MMHG | RESPIRATION RATE: 16 BRPM | TEMPERATURE: 98 F

## 2018-07-21 LAB
ANION GAP SERPL CALC-SCNC: 7 MMOL/L — SIGNIFICANT CHANGE UP (ref 5–17)
BUN SERPL-MCNC: 26 MG/DL — HIGH (ref 7–23)
CALCIUM SERPL-MCNC: 8.7 MG/DL — SIGNIFICANT CHANGE UP (ref 8.4–10.5)
CHLORIDE SERPL-SCNC: 108 MMOL/L — SIGNIFICANT CHANGE UP (ref 96–108)
CO2 SERPL-SCNC: 23 MMOL/L — SIGNIFICANT CHANGE UP (ref 22–31)
CREAT SERPL-MCNC: 1.18 MG/DL — SIGNIFICANT CHANGE UP (ref 0.5–1.3)
GLUCOSE SERPL-MCNC: 84 MG/DL — SIGNIFICANT CHANGE UP (ref 70–99)
HCT VFR BLD CALC: 33.6 % — LOW (ref 34.5–45)
HGB BLD-MCNC: 11.4 G/DL — LOW (ref 11.5–15.5)
MCHC RBC-ENTMCNC: 29.9 PG — SIGNIFICANT CHANGE UP (ref 27–34)
MCHC RBC-ENTMCNC: 33.9 GM/DL — SIGNIFICANT CHANGE UP (ref 32–36)
MCV RBC AUTO: 88.2 FL — SIGNIFICANT CHANGE UP (ref 80–100)
NRBC # BLD: 0 /100 WBCS — SIGNIFICANT CHANGE UP (ref 0–0)
PLATELET # BLD AUTO: 115 K/UL — LOW (ref 150–400)
POTASSIUM SERPL-MCNC: 4.3 MMOL/L — SIGNIFICANT CHANGE UP (ref 3.5–5.3)
POTASSIUM SERPL-SCNC: 4.3 MMOL/L — SIGNIFICANT CHANGE UP (ref 3.5–5.3)
RBC # BLD: 3.81 M/UL — SIGNIFICANT CHANGE UP (ref 3.8–5.2)
RBC # FLD: 12.4 % — SIGNIFICANT CHANGE UP (ref 10.3–14.5)
SODIUM SERPL-SCNC: 138 MMOL/L — SIGNIFICANT CHANGE UP (ref 135–145)
WBC # BLD: 6.3 K/UL — SIGNIFICANT CHANGE UP (ref 3.8–10.5)
WBC # FLD AUTO: 6.3 K/UL — SIGNIFICANT CHANGE UP (ref 3.8–10.5)

## 2018-07-21 PROCEDURE — 97110 THERAPEUTIC EXERCISES: CPT

## 2018-07-21 PROCEDURE — C1713: CPT

## 2018-07-21 PROCEDURE — 85014 HEMATOCRIT: CPT

## 2018-07-21 PROCEDURE — 99239 HOSP IP/OBS DSCHRG MGMT >30: CPT

## 2018-07-21 PROCEDURE — 73562 X-RAY EXAM OF KNEE 3: CPT

## 2018-07-21 PROCEDURE — 97530 THERAPEUTIC ACTIVITIES: CPT

## 2018-07-21 PROCEDURE — 97116 GAIT TRAINING THERAPY: CPT

## 2018-07-21 PROCEDURE — 88305 TISSUE EXAM BY PATHOLOGIST: CPT

## 2018-07-21 PROCEDURE — 97161 PT EVAL LOW COMPLEX 20 MIN: CPT

## 2018-07-21 PROCEDURE — C1776: CPT

## 2018-07-21 PROCEDURE — 85027 COMPLETE CBC AUTOMATED: CPT

## 2018-07-21 PROCEDURE — 88311 DECALCIFY TISSUE: CPT

## 2018-07-21 PROCEDURE — 97535 SELF CARE MNGMENT TRAINING: CPT

## 2018-07-21 PROCEDURE — 97165 OT EVAL LOW COMPLEX 30 MIN: CPT

## 2018-07-21 PROCEDURE — 85018 HEMOGLOBIN: CPT

## 2018-07-21 PROCEDURE — 36415 COLL VENOUS BLD VENIPUNCTURE: CPT

## 2018-07-21 PROCEDURE — 80048 BASIC METABOLIC PNL TOTAL CA: CPT

## 2018-07-21 RX ORDER — OXYCODONE HYDROCHLORIDE 5 MG/1
1 TABLET ORAL
Qty: 80 | Refills: 0 | OUTPATIENT
Start: 2018-07-21

## 2018-07-21 RX ADMIN — OXYCODONE HYDROCHLORIDE 5 MILLIGRAM(S): 5 TABLET ORAL at 08:39

## 2018-07-21 RX ADMIN — LISINOPRIL 10 MILLIGRAM(S): 2.5 TABLET ORAL at 05:41

## 2018-07-21 RX ADMIN — Medication 1000 MILLIGRAM(S): at 05:43

## 2018-07-21 RX ADMIN — OXYCODONE HYDROCHLORIDE 5 MILLIGRAM(S): 5 TABLET ORAL at 11:56

## 2018-07-21 RX ADMIN — Medication 100 MILLIGRAM(S): at 05:41

## 2018-07-21 RX ADMIN — Medication 1000 MILLIGRAM(S): at 05:41

## 2018-07-21 RX ADMIN — PANTOPRAZOLE SODIUM 40 MILLIGRAM(S): 20 TABLET, DELAYED RELEASE ORAL at 05:42

## 2018-07-21 RX ADMIN — NEBIVOLOL HYDROCHLORIDE 10 MILLIGRAM(S): 5 TABLET ORAL at 05:41

## 2018-07-21 RX ADMIN — Medication 162 MILLIGRAM(S): at 08:39

## 2018-07-21 NOTE — PROGRESS NOTE ADULT - SUBJECTIVE AND OBJECTIVE BOX
Discharge medication calendar:  (ASA 81mg Qday)  ASA EC 162mg q12h x 6 weeks then resume ASA 81mg Qday  APAP 1000mg q8h x 2-3 weeks  Meloxicam 15mg QAM x 2-3 weeks  Pantoprazole 40mg QAM x 6 weeks  Narcotic PRN  Docusate 100mg TID while taking narcotic  Miralax, Senna, or Bisacodyl PRN for treatment of constipation
POST OPERATIVE DAY #:  [1 ]   STATUS POST: [x ] Left [ ] Right  [x]TKR [ ]THR                        Patient is a 72y old  Female who presents with a chief complaint of I have left knee pain (18 Jul 2018 16:48)      Pain well controlled    OBJECTIVE:     Vital Signs Last 24 Hrs  T(C): 36.6 (20 Jul 2018 07:12), Max: 36.7 (20 Jul 2018 03:24)  T(F): 97.9 (20 Jul 2018 07:12), Max: 98 (20 Jul 2018 03:24)  HR: 77 (20 Jul 2018 07:12) (69 - 90)  BP: 141/71 (20 Jul 2018 07:12) (98/56 - 141/80)  BP(mean): --  RR: 16 (20 Jul 2018 07:12) (9 - 22)  SpO2: 94% (20 Jul 2018 07:12) (93% - 99%)    Affected extremity:          Dressing:  clean/dry/intact           Sensation; intact to light touch          Motor exam: Toes warm and mobile well perfused;  +capillary refill         No calf tenderness bilateral LE's    LABS:                        12.4   11.43 )-----------( 153      ( 20 Jul 2018 08:05 )             35.4     07-20    133<L>  |  103  |  27<H>  ----------------------------<  131<H>  4.5   |  25  |  1.20    Ca    9.0      20 Jul 2018 08:05              A/P :    -    Analgesics PRN  -    DVT ppx: [ ]ASA81 [x ]  bid[ ] Lovenox [ ] Coumadin   [ ] Eliquis   -    Weight bearing status: WBAT [x ]        PWB    [ ]     TTWB  [ ]      NWB  [ ]  -    Physical Therapy  -    Occupational Therapy  -    Dispo: Home [ x]     Rehab [ ]      YUDI [ ]      To be determined [ ]
Patient is a 72y old  Female who presents with a chief complaint of I have left knee pain (20 Jul 2018 12:03)      INTERVAL HPI/OVERNIGHT EVENTS: no acute overnight events . Patient is feeling  fine . Stable for discharge.     MEDICATIONS  (STANDING):  acetaminophen   Tablet. 1000 milliGRAM(s) Oral every 8 hours  aspirin enteric coated 162 milliGRAM(s) Oral every 12 hours  docusate sodium 100 milliGRAM(s) Oral three times a day  lisinopril 10 milliGRAM(s) Oral daily  meloxicam 15 milliGRAM(s) Oral daily  nebivolol 10 milliGRAM(s) Oral daily  pantoprazole    Tablet 40 milliGRAM(s) Oral before breakfast  simvastatin 10 milliGRAM(s) Oral at bedtime  sodium chloride 0.9%. 1000 milliLiter(s) (100 mL/Hr) IV Continuous <Continuous>    MEDICATIONS  (PRN):  aluminum hydroxide/magnesium hydroxide/simethicone Suspension 30 milliLiter(s) Oral four times a day PRN Indigestion  bisacodyl Suppository 10 milliGRAM(s) Rectal daily PRN If no bowel movement by postoperative day #2  HYDROmorphone  Injectable 0.5 milliGRAM(s) IV Push every 3 hours PRN Severe Pain (7 - 10)  magnesium hydroxide Suspension 30 milliLiter(s) Oral daily PRN Constipation  ondansetron Injectable 4 milliGRAM(s) IV Push every 6 hours PRN Nausea and/or Vomiting  oxyCODONE    IR 5 milliGRAM(s) Oral every 3 hours PRN Mild Pain (1 - 3)  oxyCODONE    IR 10 milliGRAM(s) Oral every 3 hours PRN Moderate Pain (4 - 6)  polyethylene glycol 3350 17 Gram(s) Oral daily PRN Constipation  promethazine IVPB 6.25 milliGRAM(s) IV Intermittent every 6 hours PRN nausea or vomiting  senna 2 Tablet(s) Oral at bedtime PRN Constipation      Allergies    penicillin (Hives)    Intolerances        REVIEW OF SYSTEMS:  CONSTITUTIONAL: No fever or chills  HEENT:  No headache, no sore throat  RESPIRATORY: No cough, wheezing, or shortness of breath  CARDIOVASCULAR: No chest pain, palpitations, or leg swelling  GASTROINTESTINAL: No nausea, vomiting, or diarrhea  GENITOURINARY: No dysuria, frequency, or hematuria  NEUROLOGICAL: no focal weakness or dizziness  SKIN:  No rashes or lesions   MUSCULOSKELETAL: no myalgias   PSYCHIATRIC: No depression or anxiety      Vital Signs Last 24 Hrs  T(C): 36.9 (21 Jul 2018 11:37), Max: 36.9 (21 Jul 2018 11:37)  T(F): 98.5 (21 Jul 2018 11:37), Max: 98.5 (21 Jul 2018 11:37)  HR: 70 (21 Jul 2018 11:37) (66 - 76)  BP: 120/67 (21 Jul 2018 11:37) (106/51 - 133/79)  BP(mean): --  RR: 16 (21 Jul 2018 11:37) (16 - 18)  SpO2: 97% (21 Jul 2018 11:37) (93% - 100%)    PHYSICAL EXAM:  GENERAL: NAD  HEENT:  EOMI, mmm  CHEST/LUNG:  CTA b/l, no rales, wheezes, or rhonchi  HEART:  RRR, S1, S2, []murmur  ABDOMEN:  BS+, soft, NT, ND  EXTREMITIES: no edema or calf tenderness ,  dressing dry and intact.   NERVOUS SYSTEM: AA&Ox3 , no focal neurological deficit.     DIET:     Cultures:     LABS:                        11.4   6.30  )-----------( 115      ( 21 Jul 2018 07:05 )             33.6     CBC Full  -  ( 21 Jul 2018 07:05 )  WBC Count : 6.30 K/uL  Hemoglobin : 11.4 g/dL  Hematocrit : 33.6 %  Platelet Count - Automated : 115 K/uL  Mean Cell Volume : 88.2 fl  Mean Cell Hemoglobin : 29.9 pg  Mean Cell Hemoglobin Concentration : 33.9 gm/dL  Auto Neutrophil # : x  Auto Lymphocyte # : x  Auto Monocyte # : x  Auto Eosinophil # : x  Auto Basophil # : x  Auto Neutrophil % : x  Auto Lymphocyte % : x  Auto Monocyte % : x  Auto Eosinophil % : x  Auto Basophil % : x    21 Jul 2018 07:05    138    |  108    |  26     ----------------------------<  84     4.3     |  23     |  1.18     Ca    8.7        21 Jul 2018 07:05          CAPILLARY BLOOD GLUCOSE              RADIOLOGY & ADDITIONAL TESTS:    Personally reviewed.     Consultant(s) Notes Reviewed:  [x] YES  [ ] NO    Care Discussed with [ ] Consultants  [x] Patient  [ ] Family  [x]      [ ] Other; RN  DVT ppx  Advanced directive
Post Op Day # 2    SUBJECTIVE    71yo Female status post left TKR .   Patient is alert and comfortable.    Pain is controlled with current pain regimen.  Denies nausea, vomiting, chest pain, shortness of breath, abdominal pain or fever.   No new complaints.    OBJECTIVE    Vital Signs Last 24 Hrs  T(C): 36.6 (21 Jul 2018 07:17), Max: 36.7 (20 Jul 2018 11:18)  T(F): 97.8 (21 Jul 2018 07:17), Max: 98.1 (20 Jul 2018 19:15)  HR: 67 (21 Jul 2018 07:17) (66 - 76)  BP: 107/65 (21 Jul 2018 07:17) (106/51 - 133/79)  BP(mean): --  RR: 18 (21 Jul 2018 07:17) (16 - 18)  SpO2: 96% (21 Jul 2018 07:17) (92% - 100%)  I&O's Summary    20 Jul 2018 07:01  -  21 Jul 2018 07:00  --------------------------------------------------------  IN: 0 mL / OUT: 220 mL / NET: -220 mL        Left knee incision is clean, dry and intact.   No erythema/ No exudate/ No blistering/ No ecchymosis.   The calf is supple/nontender.   Passive range of motion is acceptable to due postoperative pain.   Sensation to light touch is grossly intact distally.   The lateral cutaneous nerve is intact.   Motor function distally is intact.   No foot drop.   (2+) dorsalis pedis pulse. Capillary refill is less than 2 seconds. No cyanosis.                          11.4<L>  6.30  )-----------( 115<L>    ( 21 Jul 2018 07:05 )             33.6<L>  21 Jul 2018 07:05                        12.4   11.43<H> )-----------( 153      ( 20 Jul 2018 08:05 )             35.4   20 Jul 2018 08:05    21 Jul 2018 07:05    138    |  108    |  26<H>  ----------------------------<  84     4.3     |  23     |  1.18   20 Jul 2018 08:05    133<L>  |  103    |  27<H>  ----------------------------<  131<H>  4.5     |  25     |  1.20     Ca    8.7        21 Jul 2018 07:05  Ca    9.0        20 Jul 2018 08:05        ASSESSMENT AND PLAN    - Orthopedically stable  - DVT prophylaxis: PAS + Ecotrin  - Continue physical therapy and occupational therapy  - Weight bearing as tolerated of the left lower extremity with assistance of a walker  - Incentive spirometry encouraged  - Pain control as clinically indicated  - Disposition:  Home when cleared by medicine/PT/OT
Patient is a 72y old  Female who presents with a chief complaint of I have left knee pain (20 Jul 2018 12:03)      Subjective: feels well. pain tolerable   good appetite.  participating in PT.     cheerful    MEDICATIONS  (STANDING):  acetaminophen   Tablet. 1000 milliGRAM(s) Oral every 8 hours  aspirin enteric coated 162 milliGRAM(s) Oral every 12 hours  celecoxib 200 milliGRAM(s) Oral two times a day  docusate sodium 100 milliGRAM(s) Oral three times a day  nebivolol 10 milliGRAM(s) Oral daily  pantoprazole    Tablet 40 milliGRAM(s) Oral before breakfast  simvastatin 10 milliGRAM(s) Oral at bedtime  sodium chloride 0.9%. 1000 milliLiter(s) (100 mL/Hr) IV Continuous <Continuous>    MEDICATIONS  (PRN):  aluminum hydroxide/magnesium hydroxide/simethicone Suspension 30 milliLiter(s) Oral four times a day PRN Indigestion  HYDROmorphone  Injectable 0.5 milliGRAM(s) IV Push every 3 hours PRN Severe Pain (7 - 10)  magnesium hydroxide Suspension 30 milliLiter(s) Oral daily PRN Constipation  ondansetron Injectable 4 milliGRAM(s) IV Push every 6 hours PRN Nausea and/or Vomiting  oxyCODONE    IR 5 milliGRAM(s) Oral every 3 hours PRN Mild Pain (1 - 3)  oxyCODONE    IR 10 milliGRAM(s) Oral every 3 hours PRN Moderate Pain (4 - 6)  polyethylene glycol 3350 17 Gram(s) Oral daily PRN Constipation  promethazine IVPB 6.25 milliGRAM(s) IV Intermittent every 6 hours PRN nausea or vomiting  senna 2 Tablet(s) Oral at bedtime PRN Constipation      Allergies    penicillin (Hives)    Intolerances        REVIEW OF SYSTEMS:  negative unless otherwise specified above.    Vital Signs Last 24 Hrs  T(C): 36.7 (20 Jul 2018 11:18), Max: 36.7 (20 Jul 2018 03:24)  T(F): 98 (20 Jul 2018 11:18), Max: 98 (20 Jul 2018 03:24)  HR: 74 (20 Jul 2018 11:18) (69 - 90)  BP: 108/60 (20 Jul 2018 11:18) (98/56 - 141/80)  BP(mean): --  RR: 16 (20 Jul 2018 11:18) (9 - 18)  SpO2: 92% (20 Jul 2018 11:18) (92% - 99%)    PHYSICAL EXAM:  GENERAL: No apparent distress  HEAD:  Atraumatic, Normocephalic  EYES: conjunctiva and sclera clear  ENMT: Moist mucous membranes  NECK: Supple  CHEST/LUNG: Clear to auscultation bilaterally  HEART: Regular rate and rhythm  ABDOMEN: Soft, Nontender, Nondistended; Bowel sounds present, OBESE  EXTREMITIES:  No clubbing, cyanosis or edema  SKIN: No rashes or lesions  NERVOUS SYSTEM:  Alert & Oriented X3; Bilateral Lower extremity mobile, sensation to light touch intact  INCISION:  Dressing dry and intact        LABS:   WBC Count: 11.43 K/uL <H> (07-20 @ 08:05)  Platelet Count - Automated: 153 K/uL (07-20 @ 08:05)  Hematocrit: 35.4 % (07-20 @ 08:05)  RBC Count: 4.10 M/uL (07-20 @ 08:05)  Hemoglobin: 12.4 g/dL (07-20 @ 08:05)  Hematocrit: 39.8 % (07-19 @ 16:02)  Hemoglobin: 14.0 g/dL (07-19 @ 16:02)      07-20    133<L>  |  103  |  27<H>  ----------------------------<  131<H>  4.5   |  25  |  1.20    Ca    9.0      20 Jul 2018 08:05                                        IMAGING: images reviewed personally      Consultant Notes Reviewed and Care Discussed with relevant Consultants/Other Providers.

## 2018-07-21 NOTE — PROGRESS NOTE ADULT - ASSESSMENT
72 female    1. left tkr: opiates prn + bowel regimen.   Physical Therapy evaluation.  Discussed with orthopedic service Physician Assistant.     2. Morbid obesity s/p lap band removal 2016: no TORI    3. HTN: Blood pressure meds with hold parameters     4. CKD III/hyponatremia: avoid nephrotoxics. monitor intake and output.   saline for hydration.    5. dispo: home tomorrow  d/w RN plan of care
72 female    1. Left tkr: opiates prn + bowel regimen.   Physical Therapy .   Discussed with orthopedic service Physician Assistant.     2. Morbid obesity s/p lap band removal 2016: no TORI    3. HTN: Controlled    Blood pressure meds - Fosinopril with hold parameters .   Monitor BP closely.     4. CKD III: avoid nephrotoxins. monitor intake and output.   Encourage po  hydration.  Needs to repeat BMP with PCP in 1 wk.     5. Dispo: Stable for home today .    plan of care d/w RN

## 2018-08-03 ENCOUNTER — APPOINTMENT (OUTPATIENT)
Dept: ORTHOPEDIC SURGERY | Facility: CLINIC | Age: 73
End: 2018-08-03
Payer: MEDICARE

## 2018-08-03 VITALS
HEIGHT: 60 IN | SYSTOLIC BLOOD PRESSURE: 135 MMHG | BODY MASS INDEX: 38.28 KG/M2 | HEART RATE: 69 BPM | DIASTOLIC BLOOD PRESSURE: 74 MMHG | WEIGHT: 195 LBS

## 2018-08-03 PROCEDURE — 99024 POSTOP FOLLOW-UP VISIT: CPT

## 2018-08-03 PROCEDURE — 73562 X-RAY EXAM OF KNEE 3: CPT | Mod: LT

## 2018-08-03 RX ORDER — CLINDAMYCIN HYDROCHLORIDE 300 MG/1
300 CAPSULE ORAL
Qty: 20 | Refills: 0 | Status: ACTIVE | COMMUNITY
Start: 2018-08-03 | End: 1900-01-01

## 2018-08-10 ENCOUNTER — OUTPATIENT (OUTPATIENT)
Dept: OUTPATIENT SERVICES | Facility: HOSPITAL | Age: 73
LOS: 1 days | End: 2018-08-10
Payer: MEDICARE

## 2018-08-10 DIAGNOSIS — Z47.1 AFTERCARE FOLLOWING JOINT REPLACEMENT SURGERY: ICD-10-CM

## 2018-08-10 DIAGNOSIS — Z51.89 ENCOUNTER FOR OTHER SPECIFIED AFTERCARE: ICD-10-CM

## 2018-08-10 DIAGNOSIS — Z96.652 PRESENCE OF LEFT ARTIFICIAL KNEE JOINT: ICD-10-CM

## 2018-08-10 DIAGNOSIS — Z98.84 BARIATRIC SURGERY STATUS: Chronic | ICD-10-CM

## 2018-08-10 DIAGNOSIS — Z96.661 PRESENCE OF RIGHT ARTIFICIAL ANKLE JOINT: Chronic | ICD-10-CM

## 2018-08-10 DIAGNOSIS — Z90.89 ACQUIRED ABSENCE OF OTHER ORGANS: Chronic | ICD-10-CM

## 2018-09-14 ENCOUNTER — APPOINTMENT (OUTPATIENT)
Dept: ORTHOPEDIC SURGERY | Facility: CLINIC | Age: 73
End: 2018-09-14
Payer: MEDICARE

## 2018-09-14 VITALS
WEIGHT: 195 LBS | DIASTOLIC BLOOD PRESSURE: 67 MMHG | HEIGHT: 60 IN | SYSTOLIC BLOOD PRESSURE: 125 MMHG | BODY MASS INDEX: 38.28 KG/M2 | HEART RATE: 55 BPM

## 2018-09-14 DIAGNOSIS — Z47.1 AFTERCARE FOLLOWING JOINT REPLACEMENT SURGERY: ICD-10-CM

## 2018-09-14 PROCEDURE — 97010 HOT OR COLD PACKS THERAPY: CPT

## 2018-09-14 PROCEDURE — 97110 THERAPEUTIC EXERCISES: CPT

## 2018-09-14 PROCEDURE — 97140 MANUAL THERAPY 1/> REGIONS: CPT

## 2018-09-14 PROCEDURE — 97162 PT EVAL MOD COMPLEX 30 MIN: CPT

## 2018-09-14 PROCEDURE — 73562 X-RAY EXAM OF KNEE 3: CPT | Mod: LT

## 2018-09-14 PROCEDURE — 99024 POSTOP FOLLOW-UP VISIT: CPT

## 2018-09-14 PROCEDURE — G8979: CPT | Mod: CI

## 2018-09-14 PROCEDURE — G8978: CPT | Mod: CJ

## 2018-09-14 RX ORDER — MECLIZINE HYDROCHLORIDE 12.5 MG/1
12.5 TABLET ORAL
Qty: 10 | Refills: 0 | Status: ACTIVE | COMMUNITY
Start: 2018-08-19

## 2019-06-21 ENCOUNTER — APPOINTMENT (OUTPATIENT)
Dept: ORTHOPEDIC SURGERY | Facility: CLINIC | Age: 74
End: 2019-06-21
Payer: MEDICARE

## 2019-06-21 VITALS
WEIGHT: 195 LBS | SYSTOLIC BLOOD PRESSURE: 146 MMHG | DIASTOLIC BLOOD PRESSURE: 72 MMHG | HEIGHT: 60 IN | BODY MASS INDEX: 38.28 KG/M2 | HEART RATE: 61 BPM

## 2019-06-21 PROCEDURE — 73610 X-RAY EXAM OF ANKLE: CPT | Mod: RT

## 2019-06-21 PROCEDURE — 73562 X-RAY EXAM OF KNEE 3: CPT | Mod: LT

## 2019-06-21 PROCEDURE — 99214 OFFICE O/P EST MOD 30 MIN: CPT

## 2019-06-21 NOTE — DISCUSSION/SUMMARY
[de-identified] : X-rays reviewed the patient. She is clinically doing very well. She will return back in one year for ankle re-evaluation. She'll be seen back she is now for left knee reevaluation. At the prophylaxis was reviewed. She will be seen back today she is having concerns or complications.

## 2019-06-21 NOTE — HISTORY OF PRESENT ILLNESS
[de-identified] : Patient presents today for reevaluation of a left knee replacement and a right ankle replacement. She is 5 years out from her right ankle replacement. She is one year out from her left knee replacement. She reports both are doing well. She occasionally has some stiffness and discomfort. She does report occasional soft tissue swelling of the right ankle at the end of the day. She reports her left knee is doing well. She is more active now than she was prior to surgery. Overall she has done very well following her replacement surgeries.\par \par Review of Systems-\par Constitutional: No fever or chills. \par Cardiovascular: No orthopnea or chest pain\par Pulmonary: No shortness of breath. \par GI: No nausea or vomiting or abdominal pain.\par Musculoskeletal: see HPI \par Psychiatric: No anxiety and depression.

## 2019-06-21 NOTE — PHYSICAL EXAM
[de-identified] : The patient appears well nourished and in no apparent distress. The patient is alert and oriented to person, place, and time. Affect and mood appear normal. The head is normocephalic and atraumatic. Skin shows normal turgor with no evidence of eczema or psoriasis. No respiratory distress noted. Sensation grossly intact. MUSCULOSKELETAL:   SEE BELOW\par \par Right ankle exam demonstrate a well-healed right anterior ankle incision. Some mild soft tissue swelling. No pitting edema. Normal sensation to light touch. No venous stasis ulcerations. Passive range of motion the ankle is approximately neutral to 10° of plantar flexion. There is some mild crepitus which is asymptomatic. The Achilles intact and nontender\par \par Left Knee:\par Swelling: No\par Effusion: No\par Alignment: Normal alignment \par Flexion contracture: No\par Tenderness: No\par Extensor mechanism lag: No\par Incision: Well-healed\par Skin Temp: Normal temperature\par ROM: Flexion: 115 deg.; Extension: Zero deg,\par Laxity: No laxity with stress testing\par PF crepitus: No; \par Quadricep formation: Intact in Extension & Flexion:\par Quad/Ham St: 5/5\par \par  [de-identified] : X-rays of the left knee and the right ankle review. Implants are in good position. No signs of loosening or fracture.

## 2019-08-02 ENCOUNTER — APPOINTMENT (OUTPATIENT)
Dept: ORTHOPEDIC SURGERY | Facility: CLINIC | Age: 74
End: 2019-08-02
Payer: MEDICARE

## 2019-08-02 VITALS
BODY MASS INDEX: 38.28 KG/M2 | DIASTOLIC BLOOD PRESSURE: 77 MMHG | HEART RATE: 60 BPM | WEIGHT: 195 LBS | HEIGHT: 60 IN | SYSTOLIC BLOOD PRESSURE: 148 MMHG

## 2019-08-02 PROCEDURE — 73030 X-RAY EXAM OF SHOULDER: CPT | Mod: 50

## 2019-08-02 PROCEDURE — 99214 OFFICE O/P EST MOD 30 MIN: CPT

## 2019-08-02 RX ORDER — MELOXICAM 7.5 MG/1
7.5 TABLET ORAL
Qty: 21 | Refills: 0 | Status: COMPLETED | COMMUNITY
Start: 2019-08-02 | End: 2019-08-23

## 2019-08-02 NOTE — PHYSICAL EXAM
[de-identified] : Physical Exam:\par General: Well appearing, no acute distress\par Neurologic: A&Ox3, No focal deficits\par Head: NCAT without abrasions, lacerations, or ecchymosis to head, face, or scalp\par Eyes: No scleral icterus, no gross abnormalities\par Respiratory: Equal chest wall expansion bilaterally, no accessory muscle use\par Lymphatic: No lymphadenopathy palpated\par Skin: Warm and dry\par Psychiatric: Normal mood and affect\par \par Left Shoulder\par ·	Inspection/Palpation: no tenderness, swelling or deformities\par ·	Range of Motion: no crepitus with ROM; Active ; ER at side 15; IR to T11; Passive FF same\par ·	Strength: forward elevation in scapular plane [4/5], internal rotation [4/5], external rotation [4/5], adduction [4/5] and abduction [4/5]\par ·	Stability: no joint instability on provocative testing\par ·	Tests: Fragoso test positive, Neer positive, positive drop arm test secondary to pain, bear hug test positive, Napolean sign negative, cross arm adduction negative, lift off sign positive, hornblowers sign negative, speeds test negative, Yergason's test negative, no bicipital groove tenderness, Arteaga's Active Compression test negative, whipple test negative, bicep's load II test negative\par \par Right Shoulder\par ·	Inspection/Palpation: no tenderness, swelling or deformities\par ·	Range of Motion: no crepitus with ROM; Active ; ER at side 20 ; IR to T11; Passive FF same\par ·	Strength: forward elevation in scapular plane [4/5], internal rotation [4/5], external rotation [4/5], adduction [4/5] and abduction [4/5]\par ·	Stability: no joint instability on provocative testing\par ·	Tests: Fragoso test positive, Neer positive, positive drop arm test secondary to pain, bear hug test positive, Napolean sign negative, cross arm adduction negative, lift off sign positive, hornblowers sign negative, speeds test negative, Yergason's test negative, no bicipital groove tenderness, Arteaga's Active Compression test negative, whipple test negative, bicep's load II test negative\par  [de-identified] : X-rays of the bilateral shoulder were performed today and available for me to review. 4 views of the bilateral shoulder demonstrate no fracture or dislocation. [No] glenohumeral degenerative changes noted. [No] AC joint degenerative changes noted. No gross deformities noted. Of note she has retroverted humeral heads.

## 2019-08-02 NOTE — CONSULT LETTER
[Dear  ___] : Dear  [unfilled], [Consult Letter:] : I had the pleasure of evaluating your patient, [unfilled]. [( Thank you for referring [unfilled] for consultation for _____ )] : Thank you for referring [unfilled] for consultation for [unfilled] [Please see my note below.] : Please see my note below. [Consult Closing:] : Thank you very much for allowing me to participate in the care of this patient.  If you have any questions, please do not hesitate to contact me. [Sincerely,] : Sincerely, [FreeTextEntry2] : BLANCA WASHBURN\par  [FreeTextEntry3] : Baljinder Csatanon DO.\par Sports Medicine \par \par Orthopaedic Surgery\par \par [facility] [place] @ [facility] \par \par [address] \par [address]\[place] [address]\par \par [address] \[address] \[place] [address]\par \par Tel [phone]\par Fax [phone]\par \par For same day and next day orthopedic appointments contact:\par [email or web address] |[phone])\par Appointments available nights and weekends!  \par \par [facility] [place]\par Visit us at [facility]/orthopaedic-institute

## 2019-08-02 NOTE — DISCUSSION/SUMMARY
[de-identified] : Ms. COLIN is a pleasant 73 year old female who presents with bilateral shoulder pain. HD R. RL>R. Patient states she has been having intermittent leftshoulder pain for about two years. Patient states she saw a sports medicine doctor in 2018 and was giving a total of one injection to the left shoulder which gave her 3 months of relief. She did not do any physical therapy. Patient takes Tylenol only qhs. At this time she has only moderate pain in both shoulders. Her biggest complaint is of pain at night. I recommend a trial of physical therapy as well as the NSAID regimen. I gave her prescription for meloxicam once a day for 10 days. I also gave her prescription for physical therapy. I will see her back in 6-8 weeks for clinical reassessment. If she has no improvement at that time we may consider a steroid injection. She does understand the plan all questions were answered by both her and her , who is here for the entire visit.

## 2019-08-02 NOTE — HISTORY OF PRESENT ILLNESS
[Worsening] : worsening [5] : an average pain level of 5/10 [2] : a minimum pain level of 2/10 [9] : a maximum pain level of 9/10 [Lifting] : worsened by lifting [NSAIDs] : relieved by nonsteroidal anti-inflammatory drugs [de-identified] : Ms. COLIN is a pleasant 73 year old female who presents with bilateral shoulder pain. HD R. RL>R. Patient states she has been having intermittent leftshoulder pain for about two years. Patient states she saw a sports medicine doctor in 2018 and was giving a total of one injection to the left shoulder which gave her 3 months of relief. She did not do any physical therapy. Patient takes Tylenol only qhs.

## 2019-09-06 ENCOUNTER — APPOINTMENT (OUTPATIENT)
Dept: ORTHOPEDIC SURGERY | Facility: CLINIC | Age: 74
End: 2019-09-06
Payer: MEDICARE

## 2019-09-06 VITALS
DIASTOLIC BLOOD PRESSURE: 79 MMHG | SYSTOLIC BLOOD PRESSURE: 149 MMHG | HEART RATE: 74 BPM | HEIGHT: 60 IN | BODY MASS INDEX: 38.28 KG/M2 | WEIGHT: 195 LBS

## 2019-09-06 DIAGNOSIS — M25.511 PAIN IN RIGHT SHOULDER: ICD-10-CM

## 2019-09-06 DIAGNOSIS — M25.512 PAIN IN LEFT SHOULDER: ICD-10-CM

## 2019-09-06 DIAGNOSIS — M75.80 OTHER SHOULDER LESIONS, UNSPECIFIED SHOULDER: ICD-10-CM

## 2019-09-06 PROCEDURE — 99213 OFFICE O/P EST LOW 20 MIN: CPT

## 2019-12-06 NOTE — PHYSICAL THERAPY INITIAL EVALUATION ADULT - LONG TERM MEMORY, REHAB EVAL
OCCUPATIONAL THERAPY Observation treatment (outpatient in bed status):    Date: 12/6/2019  Recorded diagnosis/complaint at time of admission:  There are no active hospital problems to display for this patient.    Co-morbidities:   Patient Active Problem List   Diagnosis   • Atrial fibrillation treated with ablation   • Type II or unspecified type diabetes mellitus without mention of complication, not stated as uncontrolled   • Gout, unspecified   • Herpes genitalis   • Essential hypertension, benign   • Obesity, unspecified   • Esophageal reflux   • Unspecified disease of pericardium   • BERNIE on CPAP   • Vasospastic angina (CMS/HCC)   • Achilles bursitis or tendinitis   • Contracture of tendon (sheath)   • Pain in limb   • Enthesopathy of unspecified site   • Diverticulitis   • TGA (transient global amnesia)   • Chest pain   • Dyspnea on exertion   • Abnormal stress test   • Elevated diaphragm   • Obesity, morbid (CMS/HCC)   • Migraine   • History of pericarditis, pericardial effusion treated surgically   • Fibrocystic breast   • Peripheral neuritis   • Hirsutism   • Achilles tendinitis   • Pain in shoulder   • Possible Gastroparesis   • Rhinitis, allergic   • Back pain   • Restrictive ventilatory defect   • Dyslipidemia   • Chronic low back pain   • Primary osteoarthritis of both knees   • Sebaceous cyst of labia   • Coronary artery disease involving native coronary artery of native heart without angina pectoris   • Chest pain, atypical   • Diabetic peripheral neuropathy (CMS/HCC)   • Episcleritis   • Diverticulitis of intestine without perforation or abscess without bleeding   • Iron deficiency anemia due to chronic blood loss     Task Modification: clinical decision making of low complexity, no task modification  Treatment Diagnosis: Pain and Impaired Joint Mobility    RTKR  Therapy Precautions:  Weight Bearing Status WBAT RLE    Activity: As tolerated    Cognition: Alert and [place] x3, lethargic      SUBJECTIVE:    Patient reported feeling terrible due to pain  Patient's personal goal for therapy: open to post acute therapy    Prior Treatment: no therapies in the past year for current condition. Hospitalization, home health services or skilled nursing facility in the last 30 days: No, per patient.    Home Environment/Social Support: see Prior Living/Function flowsheet for information     Safety:   History of Falling in the Last Year (prior to admission): No  Violence/Abuse Screen  In the past, have you ever been physically hurt, threatened, controlled or made to feel afraid by someone close to you?: No  Currently, are you in a relationship where you are being physically hurt, threatened, controlled or made to feel afraid?: No    Pain:  Pt reported 8/10 pain throughout session; RN aware     OBSERVATION:   Patient is utilizing IV, O2 and Telemetry  Skin Integrity: Intact  Edema: post-surgical right, lower extremity  Collaboration with: Nurse Carolina      Vital Signs: not warranted at this time    ROM (degrees):  within functional limits    Strength (out of 5 in available AROM):  UE: within functional limits    Neurological:  Coordination: intact  Tone: intact    Balance:   Static Sitting: intact  Dynamic Sitting: intact  Static Standing: CGA and 2ww  Dynamic Standing: CGA and 2ww       ADLs:   Toileting:  Level of Assistance: Contact Guard Assistance (CGA)  Reason for Assistance: requires increased time to complete, safety due to pain, weakness, verbal cues for positioning, fatigue  Adaptive Equipment: bedside commode  [facility], educated on importance of having staff present for transfers, RN placed alarm for future safety    Upper Body Bathing:   Not addressed this session    Upper Body Dressing:   Not addressed this session    Lower Body Bathing:   Not addressed this session    Lower Body Dressing:   Deferred secondary to pain    Hygiene/Grooming:   Not addressed this session    Self  Feeding:  Patient at baseline status at this time.  OT will not address this area unless change in functional status occurs throughout hospital stay.     ADL Functional Mobility:  Not addressed this session       MOBILITY:    Bed:   Not addressed this session    Transfers:   Device Used: 2 wheeled walker  Sit to Stand: Contact Guard Assistance (CGA)  Stand to Sit: Contact Guard Assistance (CGA)  Toilet: Contact Guard Assistance (CGA)  Reason for Assistance: requires increased time to complete, safety due to lines and pain, weakness, verbal cues for positioning, fatigue       Ambulation:   Assistance Level: Contact Guard Assistance (CGA)  Distance: 40 feet  Device Used: 2 wheeled walker  Reason for Assistance: requires increased time to complete, safety due to pain and lines, weakness, verbal cues for relaxation, fatigue    Exercises:  Not addressed this session     Activity Tolerance: limited by pain     GOALS:     Rehab potential is good due to positive factors age, recent onset and negative factors not apparent at this time    Review Date: Goals to be met by end of plan of care.  Patient will complete upper body bathing with Modified [place] (mod I).  Progressing toward  Patient will complete upper body dressing with Modified [place] (mod I).  Progressing toward  Patient will complete lower body bathing with Modified [place] (mod I).  Progressing toward  Patient will complete lower body dressing with Modified [place] (mod I).  Progressing toward  Patient will complete toileting with Modified [place] (mod I).  Progressing toward  Patient will complete tub/shower transfer with Stand By Assistance (SBA).  Progressing toward  Patient will complete hygiene/grooming with Modified [place] (mod I).  Progressing toward  Patient will complete item retrieval with Modified [place] (mod I). Progressing toward      PLAN OF CARE:    OT Frequency: 7 days/week  Duration: 2-4 days  Treatment  Interventions: ADL retraining, Functional transfer training, UE strengthening/ROM, Endurance training, Patient/Family training, Equipment eval/education     RECOMMENDATIONS/EDUCATION:    Learner: Patient  Readiness to Learn: acceptance  Learning Method: Verbal and Demonstration  Barriers to Learning: no barriers apparent at this time  Learning Evaluation: Verbalizes Understanding and Needs Further Training  Teaching Content: Positioning, Safety Awareness, Functional Mobility, ADL training and Role of occupational therapy in the hospital setting  Please reference the patient education activity for further information regarding the patient's learning assessment.  Equipment for discharge: to be determined - continuing to assess needs at this time  Ordered: no     THERAPY DAILY BILLING:   Payor: UNITED [employer] MEDICARE SOLUTION / Plan: UNITED [employer] MEDICARE COMPLETE ASSIST (PPO SNP) / Product Type: MEDADV        Co- signature only required for Medicare/T-19    Untimed Procedures (bill one unit per date of service for each)  No evaluation codes were used on this date of service    Timed Procedures (bill is based on time in treatment)  75324 Therapeutic Activities, 15 minutes and 58261 Self Care/Home Management, 15 minutes    Total Treatment Time: 30 minutes  (timed and untimed procedure minutes)       Treatment Plan for Next Session: lower body dressing and adaptive equipment training    The plan of care and goals were established with the patient and she is in agreement.     ASSESSMENT:      Patient presents to occupational therapy on POD 2 s/p right TKR.  Patient is demonstrating decreased range of motion, decreased strength, pain, decreased activity tolerance which is limiting the completion of all ADLs and all functional mobility.  Further skilled occupational therapy is required to address these limitations in attempt to maximize the patient's independence.    Recommendations for Discharge: OT WI: Post acute  therapy        Recommendation for Discharge: PT WI: Post acute therapy(versus home with home therapy)           After today's session this therapist is recommending the above location for optimal discharge.  This recommendation is supported by at initial evaluation  patient scored a 40.22 on the Am-Pac Activity domain, which indicates patient is safe to return home .  Pt reports unsure about how much family will be able to assist; may benefit from post acute pending progress and assist available at home.        intact

## 2020-06-26 ENCOUNTER — APPOINTMENT (OUTPATIENT)
Dept: ORTHOPEDIC SURGERY | Facility: CLINIC | Age: 75
End: 2020-06-26
Payer: MEDICARE

## 2020-06-26 VITALS
HEART RATE: 62 BPM | HEIGHT: 60 IN | WEIGHT: 195 LBS | SYSTOLIC BLOOD PRESSURE: 146 MMHG | BODY MASS INDEX: 38.28 KG/M2 | DIASTOLIC BLOOD PRESSURE: 77 MMHG

## 2020-06-26 VITALS — TEMPERATURE: 96.7 F

## 2020-06-26 DIAGNOSIS — M25.561 PAIN IN RIGHT KNEE: ICD-10-CM

## 2020-06-26 PROCEDURE — 73610 X-RAY EXAM OF ANKLE: CPT | Mod: RT

## 2020-06-26 PROCEDURE — 99214 OFFICE O/P EST MOD 30 MIN: CPT

## 2020-06-26 PROCEDURE — 73562 X-RAY EXAM OF KNEE 3: CPT | Mod: 50

## 2020-06-26 NOTE — HISTORY OF PRESENT ILLNESS
[de-identified] : Patient is s/p left TKR 7/19/18 and  right ankle replacement 5/30/2013. The patient is doing very well. She reports that her left knee replacement is allowing her to be active. She reports a good motion of the right ankle. She's not had any injuries or falls.  She is not currently use a cane or walker. She does not take pain medicine. She does report some clicking of the right knee at times. This is worse when she descends stairs.\par \par Review of Systems-\par Constitutional: No fever or chills. \par Cardiovascular: No orthopnea or chest pain\par Pulmonary: No shortness of breath. \par GI: No nausea or vomiting or abdominal pain.\par Musculoskeletal: see HPI \par Psychiatric: No anxiety and depression.

## 2020-06-26 NOTE — PHYSICAL EXAM
[de-identified] : The patient appears well nourished and in no apparent distress. The patient is alert and oriented to person, place, and time. Affect and mood appear normal. The head is normocephalic and atraumatic. Skin shows normal turgor with no evidence of eczema or psoriasis. No respiratory distress noted. Sensation grossly intact. MUSCULOSKELETAL:   SEE BELOW\par \par Right ankle exam demonstrates well-healed surgical incision consistent with past ankle replacement. No signs of acute trauma. Motion is approximately 5° of dorsiflexion to approximately 15° of plantarflexion. Normal sensation to light touch. No ulcerations. Normal temperature.\par \par Right knee exam demonstrates normal one. No signs of acute trauma. No past surgical scars. There is some mild crepitus of patellofemoral joint with us range of motion. Test range of motion is 0-115° of flexion. The knee is stable to laxity testing. No instability appreciated. No extensor mechanism lag. No flexion contracture.\par \par Left knee exam demonstrates well-healed surgical incisions. Normal temperature. No effusion. Normal alignment. Motion is 0-115° of flexion. No instability with stress testing. No extensor mechanism lag. No flexion contracture. [de-identified] : X-rays of the right knee reviewed. Mild medial and patellofemoral joint degenerative changes appreciated. Early osteophyte admission. The retained hardware.\par \par Left knee exam demonstrates hardware in good position. No signs of loosening or fracture.\par \par Right ankle x-rays demonstrate hardware consistent with an ankle replacement. No signs of loosening or fracture.

## 2020-06-26 NOTE — DISCUSSION/SUMMARY
[de-identified] : Today's x-rays reviewed with the patient. The left knee replacement on the right ankle replacement doing well. She'll continue to be active. As for her right knee, should she develop increasing knee pain as a result of the degenerative changes, she will contact the office. She may require an injection at that time. She will otherwise be seen back in the future as needed. All questions are answered to the patient's satisfaction.

## 2020-07-31 ENCOUNTER — APPOINTMENT (OUTPATIENT)
Dept: ORTHOPEDIC SURGERY | Facility: CLINIC | Age: 75
End: 2020-07-31
Payer: MEDICARE

## 2020-07-31 VITALS
SYSTOLIC BLOOD PRESSURE: 157 MMHG | BODY MASS INDEX: 38.28 KG/M2 | HEIGHT: 60 IN | WEIGHT: 195 LBS | DIASTOLIC BLOOD PRESSURE: 79 MMHG | HEART RATE: 67 BPM

## 2020-07-31 VITALS — TEMPERATURE: 96.5 F

## 2020-07-31 PROCEDURE — 99214 OFFICE O/P EST MOD 30 MIN: CPT

## 2020-07-31 PROCEDURE — 73562 X-RAY EXAM OF KNEE 3: CPT | Mod: LT

## 2020-07-31 NOTE — DISCUSSION/SUMMARY
[de-identified] : The patient is currently doing well. No signs of infection or trauma. X-rays reviewed. She will continue with activities as tolerated. If her condition should change, she's contact the office immediately for reevaluation.

## 2020-07-31 NOTE — PHYSICAL EXAM
[de-identified] : The patient appears well nourished and in no apparent distress. The patient is alert and oriented to person, place, and time. Affect and mood appear normal. The head is normocephalic and atraumatic. Skin shows normal turgor with no evidence of eczema or psoriasis. No respiratory distress noted. Sensation grossly intact. MUSCULOSKELETAL:   SEE BELOW\par \par Left knee exam demonstrates well-healed surgical incisions. Normal temperature. No effusion. No erythema. No ecchymosis. No soft tissue swelling. Normal alignment. Motion is 0-115° of flexion. No instability with stress testing. No extensor mechanism lag. No flexion contracture. [de-identified] : Left knee exam demonstrates hardware in good position. No signs of loosening or fracture.\par \par

## 2020-07-31 NOTE — HISTORY OF PRESENT ILLNESS
[de-identified] : Patient presents today with  for evaluation of left knee pain. She is status post left total knee arthroplasty in 2017. She was seen within a month ago. At that time she did well. She does not recall any injuries. No traumatic events. She reports her developing knee pain one morning about 2 weeks ago. She reports within approximately 24-48 hours the pain and swelling he had resolved. She currently has no knee pain. She is performing normal activities. She reports with good active range of motion. She is not currently using a cane or walker. She is not taking any pain medication. She on daily ASA 81mg. \par \par Review of Systems-\par Constitutional: No fever or chills. \par Cardiovascular: No orthopnea or chest pain\par Pulmonary: No shortness of breath. \par GI: No nausea or vomiting or abdominal pain.\par Musculoskeletal: see HPI \par Psychiatric: No anxiety and depression.

## 2020-07-31 NOTE — REASON FOR VISIT
[Follow-Up Visit] : a follow-up visit for [Artificial Knee Joint] : artificial knee joint [FreeTextEntry2] : left TKR 7/19/18.

## 2020-08-04 NOTE — OCCUPATIONAL THERAPY INITIAL EVALUATION ADULT - BED MOBILITY/TRANSFERS, PREVIOUS LEVEL OF FUNCTION, OT EVAL
Reason For Visit    Chief Complaint   Patient presents with   • School Physical          History of Present Illness    Nutrition: eats 3 meals/day. Consumes fruits and vegetables. Snacks through out the day. Healthy weight, has enough food at home.  Elimination: Problems do not include constipation, diarrhea, or urinary symptoms   Sleep: Sleeps through out the night uninterrupted. Gets at least 8 hours of rest.  Dental: Brushes teeth 2x's/day. Does not floss. Unknown last dental visit     Covid: Negative Screening    Vaginal Health: Itchiness and mild burning x's several weeks.         No LMP recorded.  7/2020     Review of Systems  Review of Systems   Constitutional: Negative.    HENT: Negative.    Eyes: Negative.    Respiratory: Negative.    Cardiovascular: Negative.    Gastrointestinal: Negative.    Endocrine: Negative.    Genitourinary: Positive for vaginal discharge.   Musculoskeletal: Negative.    Skin: Negative.    Allergic/Immunologic: Negative.    Neurological: Negative.    Hematological: Negative.    Psychiatric/Behavioral: Negative.        Current Med  No current outpatient medications on file.     No current facility-administered medications for this visit.          Past Medical History  No past medical history on file.      Family History  Family History   Problem Relation Age of Onset   • Cancer Brother    • Diabetes Maternal Grandmother    • Diabetes Maternal Grandfather           Social History  Social History     Socioeconomic History   • Marital status: Single     Spouse name: Not on file   • Number of children: Not on file   • Years of education: Not on file   • Highest education level: Not on file   Occupational History   • Not on file   Social Needs   • Financial resource strain: Not on file   • Food insecurity:     Worry: Never true     Inability: Never true   • Transportation needs:     Medical: Not on file     Non-medical: Not on file   Tobacco Use   • Smoking status: Never Smoker   •  Smokeless tobacco: Never Used   Substance and Sexual Activity   • Alcohol use: Never     Frequency: Never   • Drug use: Never   • Sexual activity: Never   Lifestyle   • Physical activity:     Days per week: Not on file     Minutes per session: Not on file   • Stress: Not on file   Relationships   • Social connections:     Talks on phone: Not on file     Gets together: Not on file     Attends [employer] service: Not on file     Active member of club or organization: Not on file     Attends meetings of clubs or organizations: Not on file     Relationship status: Not on file   • Intimate partner violence:     Fear of current or ex partner: Not on file     Emotionally abused: Not on file     Physically abused: Not on file     Forced sexual activity: Not on file   Other Topics Concern   • Not on file   Social History Narrative    Lives with mother and father, pt states feeling safe at home.        HEADSS ASSESSMENT  Completed        Screening    R 20/25  L 20/35     Vitals     Vitals:    08/04/20 0915   BP: 105/69   Pulse: 74   Temp: 97 °F (36.1 °C)       Physical Exam  Physical Exam   Constitutional: She is oriented to person, place, and time. She appears well-developed and well-nourished. No distress.   HENT:   Head: Normocephalic.   Right Ear: External ear normal.   Left Ear: External ear normal.   Nose: Nose normal.   Mouth/Throat: Oropharynx is clear and moist.   Eyes: Pupils are equal, round, and reactive to light. Conjunctivae and EOM are normal. Right eye exhibits no discharge. Left eye exhibits no discharge.   Neck: Normal range of motion. Neck supple. No JVD present. No tracheal deviation present. No thyromegaly present.   Cardiovascular: Normal rate, regular rhythm and normal heart sounds.   No murmur heard.  Pulmonary/Chest: Effort normal and breath sounds normal. No respiratory distress. She has no wheezes.   Abdominal: Soft. Bowel sounds are normal. She exhibits no distension. There is no abdominal  tenderness.   Musculoskeletal: Normal range of motion.         General: No tenderness, deformity or edema.   Lymphadenopathy:     She has no cervical adenopathy.   Neurological: She is alert and oriented to person, place, and time. She has normal reflexes.   Skin: Skin is warm and dry. No rash noted.   Psychiatric: She has a normal mood and affect. Her behavior is normal.   Nursing note and vitals reviewed.      Immunizations  Immune Globulin Outpatient Medication (2160h ago, onward)    Up to date            IMPRESSION  Elsie is a 14 y/o F who presents on mobile unit for physical exam. Pt is well appearing. Nutrition and BMI discussed in great detail. Pt educated on the importance of oral hygiene and regular dental appointments. Immunizations up to date.  HEADSS assessment completed. Pt to follow up with PMD as needed.  Pt c/o vaginal itchiness with mild burning. Clear to white discharge. Diagnosis of yeast infection made based of symptoms, exam not completed. Rx written for Diflucan 150 mg PO x's 1 dose. Mother made aware. Pt is not sexually active.        Assessment  Problem List Items Addressed This Visit     None      Visit Diagnoses     School physical exam    -  Primary    Vaginal yeast infection                Plan  No ref. provider found  Follow up with PCP       Signatures  Lashon Wells, CNP         independent

## 2020-09-24 ENCOUNTER — RX RENEWAL (OUTPATIENT)
Age: 75
End: 2020-09-24

## 2020-09-24 RX ORDER — DICLOFENAC SODIUM 10 MG/G
1 GEL TOPICAL
Qty: 100 | Refills: 1 | Status: ACTIVE | COMMUNITY
Start: 2020-07-31 | End: 1900-01-01

## 2020-10-23 NOTE — PATIENT PROFILE ADULT. - PAIN CHRONIC, PROFILE
62 yo female with preop dx. diverticulitis present to pre surgical testing.  Pt is scheduled for open lower anterior resection incisional hernia repair with abdominal wall reconstruction.  Pt is s/p partial colectomy, creation of colostomy 7/2020 for diverticulitis.      Pt underwent reversal of Elias's, creation of loop ileostomy, and ventral hernia repair on 10/19/20. Patient recovered postoperatively on the floor. Her pain was controlled with a dilaudid PCEA, which she had persistent nausea from. The PCEA was switched to Ropivicaine by acute pain management, which the patient tolerated much better. The PCEA and Fragoso catheter were discontinued on POD#3. The patient was started on CLD with high ostomy output. She was repleted 1:1 for every cc above 1L. She was started on a regular diet on POD#4, and her ostomy output decreased. She was seen by physical therapy who recommended home with no needs. She received drain teaching and was comfortable managing her MIL and ostomy at home. On the day of discharge the patient was hemodynamically stable, tolerating a regular diet, pain was controlled on PO meds, and she was functional in her ADLs.    She will follow up with Dr. Noel within one week of discharge.    CAPRINI score = 8. Pt will be discharged on prophylactic anticoagulation yes

## 2020-11-12 NOTE — DISCHARGE NOTE ADULT - REASON FOR ADMISSION
Internal Medicine Daily Progress Note    Dorita Perez  58 year old female  [id number]    Date:  11/12/2020     Chief Complaint: COVID pna, acute resp failure, KENA, RA    Subjective:  Patient reports improved shortness of breath, no fever.  Still complains of dry cough..    Allergies-reviewed    Review of Systems:  10 point review of systems:  Pertinent positives and negatives are mentioned in the history of present illness, otherwise negative.     Vitals:   Vitals:    11/12/20 0500   BP:    Pulse:    Resp:    Temp: 98.7 °F (37.1 °C)       Physical Examination:  General:  Awake, alert, oriented to person, place and time.  In mild acute respiratory distress.  HEENT:  Head is normocephalic, atraumatic.  AUSTIN.  Oral mucosa moist.  Neck:  Supple.  No JVD.  No LAD.  Lungs:  Prolonged expiration  with bilateral rales and wheezes   Heart:  Tachycardia.  No murmurs, gallops or rubs.  Dorsalis pedis and posterior tibial intact.  Abdomen:  Soft, positive bowel sounds.  Nontender.  Nondistended.  Musculoskeletal:  No clubbing, cyanosis or edema in bilateral lower extremities.  Skin:  No lesions, rashes or ulcers.  No induration or erythema.  Neurological:  No focal neurologic deficit, moves all extremities, reflexes intact, strength upper and lower extremities and sensation intact, cranial nerves II through XII grossly intact.    Past Medical History:   Past Medical History:   Diagnosis Date   • Allergic rhinitis    • Anemia of chronic kidney failure    • Asthma    • Atrial fibrillation, chronic (CMS/[facility])    • Bicuspid aortic valve    • Bronchiectasis (CMS/[facility]) 10/2017   • Bursitis of left hip     on Meloxicam    • Cataracts, bilateral    • CHF (congestive heart failure) (CMS/[facility])    • CKD (chronic kidney disease), stage III (CMS/[facility])     prerenal azotemia   • Coarctation of aorta    • COPD (chronic obstructive pulmonary disease) (CMS/[facility])    • Dry eyes    • Essential (primary) hypertension    • Eye abnormality     gets  I have left knee pain \"blisters\" in her eyes   • Fracture    • Gastroesophageal reflux disease    • Gout    • High cholesterol    • History of colon polyps    • Hyperparathyroidism (CMS/HCC)    • Hyperthyroidism    • Left ventricular noncompaction (CMS/HCC)    • Long term (current) use of anticoagulants    • Mitral valve prolapse    •  (normal spontaneous vaginal delivery)     3   • Osteoarthritis    • Pacemaker     [facility]   • Pleurisy    • Pneumonia    • Prediabetes    • Pulmonary hypertension (CMS/HCC)     [facility]  Uses CPAP to maintain oxygen levels at night   • Sinusitis, chronic    • Sleep apnea     CPAP     Past Surgical History:   Procedure Laterality Date   • Aorta surgery      coarctation of the aorta   • Appendectomy      with  section    • Av node ablation  2010    after permanent pacer insertion   • Cardiac catherization Left     Left heart cath - Normal coronary arteries   • Cardiac catherization Right     Pulm HTN  /46/66 PCW 17   • Cardiac catherization Right 2010    PA 56/27/37  PCW 24 mmHg CI 2.2   • Cardiac surgery     • Cardiovascular stress test  2018   •  section, classic      with appendectomy   • Colonoscopy  2016    w/ polypectomy repeat in 5 years-[facility]   • Esophagogastroduodenoscopy  2016   • Hand surgery      right thumb for athritis   • Hysterectomy      menorrhagia   • Myomectomy      fibroid   • Nasal septum surgery     • Pacemaker     • Pacer generator change  10/2016    [facility]   • Removal gallbladder  2016    Livan Garcia   • Right heart cath  2018       Medications:  Reviewed.    Laboratory Data :    Recent Labs   Lab 20  0459 11/10/20  0717 20  2035 20  0930 20  0100 20  0051   WBC 6.8 3.6*  3.5*  --   --   --  5.9   HCT 38.8 37.2  37.8  --   --   --  34.6*   HGB 12.6 12.1  12.1  --   --   --  11.1*    148  146  --   --   --   134*   INR 3.0 1.9  --   --   --  1.6   PTT  --  46* >200* 48*  --   --    SODIUM 138 137  --   --   --  136   POTASSIUM 4.4 4.2  --   --   --  3.4   CHLORIDE 105 105  --   --   --  103   CO2 25 27  --   --   --  27   CALCIUM 9.1 9.0  --   --   --  8.5   GLUCOSE 121* 105*  --   --   --  103*   BUN 37* 29*  --   --   --  29*   CREATININE 1.18* 1.32*  --   --  1.90* 1.93*   AST  --  48*  --   --   --  48*   GPT  --  54  --   --   --  58   ALKPT  --  189*  --   --   --  183*   BILIRUBIN  --  0.5  --   --   --  0.7   ALBUMIN  --  3.5*  --   --   --  3.6       Assessment and Plan:    1. Acute on chronic hypoxemic respiratory failure sec to COVID-19 PNA  -continue treatment with dexamethasone 4/10  -treatment with Remdesivir is contraindicated secondary to renal failure  -supplement oxygen, on 2 L  -continue home inhalers  -prone positioning  -consider covalescent plasma  -continue to monitor inflammatory markers   -Pulmonary and ID- following      2. Acute renal failure in a patient with chronic kidney disease creatinine 1.83 baseline is usually around 1.3 avoid hypotension and nephrotoxic medications nephrology consult appreciated.   - improving   -GFR 45     3. Atrial fibrillation rate controlled anticoagulated with warfarin   - INR is pending    4. KENA- cont CPAP    5. DVT/GI proph    6. Code status- full     7. RA- on Arava PTA    Patient will require more than 2 nights of hospital stay for treatment of COVID pna and respiratory failure.  We will change admission status to inpatient.     d/c plan - when weaned of Oxygen      DR Chandler

## 2021-05-19 DIAGNOSIS — M25.571 PAIN IN RIGHT ANKLE AND JOINTS OF RIGHT FOOT: ICD-10-CM

## 2021-05-21 ENCOUNTER — APPOINTMENT (OUTPATIENT)
Dept: ORTHOPEDIC SURGERY | Facility: CLINIC | Age: 76
End: 2021-05-21
Payer: MEDICARE

## 2021-05-21 VITALS
DIASTOLIC BLOOD PRESSURE: 79 MMHG | HEIGHT: 63 IN | HEART RATE: 67 BPM | SYSTOLIC BLOOD PRESSURE: 149 MMHG | WEIGHT: 195 LBS | BODY MASS INDEX: 34.55 KG/M2

## 2021-05-21 PROCEDURE — 99214 OFFICE O/P EST MOD 30 MIN: CPT

## 2021-05-21 PROCEDURE — 73562 X-RAY EXAM OF KNEE 3: CPT | Mod: 50

## 2021-05-21 PROCEDURE — 99072 ADDL SUPL MATRL&STAF TM PHE: CPT

## 2021-05-21 PROCEDURE — 73610 X-RAY EXAM OF ANKLE: CPT | Mod: RT

## 2021-05-21 NOTE — PROCEDURE
[Aspiration] : Aspiration [Injection] : Injection [Right] : of the right [Knee Joint] : knee joint [Effusion] : Effusion [Patient] : patient [Risk] : risk [Benefits] : benefits [Alternatives] : alternatives [Bleeding] : bleeding [Infection] : infection [Allergic Reaction] : allergic reaction [Verbal Consent Obtained] : verbal consent was obtained prior to the procedure [Alcohol] : Alcohol [Ethyl Chloride Spray] : ethyl chloride spray was used as a topical anesthetic [Superior] : superior [Anterior] : anterior [18] : an 18-gauge [___ mL Fluid] : [unfilled] mL of [Yellow] : yellow [Same Needle/New Syringe] : the syringe was changed and the same needle was left in place and [1% Lidocaine___(mL)] : [unfilled] mL of 1% Lidocaine [Methylpred. 40mg/mL___(mL)] : [unfilled] mL of 40mg/mL methylprednisolone [Bandage Applied] : a bandage [Tolerated Well] : The patient tolerated the procedure well [None] : none

## 2021-05-21 NOTE — HISTORY OF PRESENT ILLNESS
[Stable] : stable [de-identified] : Patient is a 75-year-old female who presents today for evaluation regarding her both knees and right ankle area she is status post left total knee replacement and a right ankle replacements years ago. She states that she's been doing very well in her left knee and right ankle without any complaints. She states that she has returned back to her previous activities without discomfort. However she does return today complaining of pain in her right knee. He states the pain started approximately 5 months ago. She describes it as an on-and-off discomfort which occurs mostly with bending her knee or with even with prolonged sitting. She states that she did see him orthopedist in [place]. Who gave her a cortisone injection she states that she felt a marked improvement for a approximately one month. However when the pain returned she was given an additional cortisone injection. She states that this pain relief lasted one week. But in that week. She drove from [place] to [place] with her knee in a bent position. She states it is markedly in aggravated her condition. And presents today still complaining of pain. She also states that she gets some swelling on and off as well as a clicking sensation. She denies buckling or locking.

## 2021-05-21 NOTE — DISCUSSION/SUMMARY
[de-identified] : Plan the patient is to continue her present activities. She wasn't sure that she is doing very well following her ankle and knee replacement. And to continue with her activities. In the right knee. She was explained she does have some mild osteoarthritic changes but that much of her discomfort is stemming from her chondromalacia patella. She was given a cortisone injection today along with an aspiration. Instructed to continue with a good exercise program principally for quadriceps strengthening exercises. She is also advised to continue with ice moist heat and anti-inflammatories when needed. It is suggested that she return back to the office in another year for reevaluation and management. However if the pain persists or worsens she is instructed to return back to the office

## 2021-05-21 NOTE — PHYSICAL EXAM
[Antalgic] : antalgic [LE] : Sensory: Intact in bilateral lower extremities [ALL] : dorsalis pedis, posterior tibial, femoral, popliteal, and radial 2+ and symmetric bilaterally [de-identified] : Left knee exam. Patient is status post left total knee replacement. There is a well-healed surgical scar with no evidence of infection superficial or deep. There is no redness swelling or discharge noted. She is nontender on examination. Has excellent range of motion. With no evidence of ligamentous laxity. No evidence of any muscle atrophy. No motor or sensory deficit. Patient does have good distal pulses with no calf tenderness.\par \par On physical examination of the right ankle. Patient is status post ankle replacement. There is a well-healed old scar noted from her previous surgery. There is no redness swelling or discharge noted. She is nontender on examination. There is no evidence of impingement nor ligamentous laxity. Anterior drawer test was negative. Toes are pink and mobile with no evidence of any motor or sensory deficit. She is neurovascularly intact with good distal pulses. No calf tenderness. No evidence of impending compartment syndrome.\par \par On physical examination of the right knee. The skin is clean dry and intact with no areas of redness swelling or discharge noted. There is some pain and tenderness primarily in the patellofemoral compartment. She has excellent range of motion both passively and actively. She is able to fully extend the knee flexed he has 120°. There is no evidence of ligamentous laxity. As this was tested both anterior and posterior drawer test as well as varus and valgus stress testing. There is no evidence of any muscle atrophy. No evidence of any motor or sensory deficit. Patient does have good distal pulses with no calf tenderness. Vaishali's and compression test were both negative for possible meniscal tear. However during a squat test. She felt anterior knee pain. [de-identified] : X-rays of the left knee reveal status post left total knee replacement. The hardware is in place and shows excellent alignment as well as fixation. There is no evidence of any fracture dislocation or loosening of any hardware.\par \par X-rays of the right ankle reveal status post right total ankle replacement. The hardware is in place and shows excellent alignment as well as fixation. There is no evidence of any fracture or dislocation.\par \par X-rays of the right knee reveal some mild osteoarthritic changes with some small narrowing of the joint space. There was no evidence of any fracture or dislocation.

## 2021-07-16 ENCOUNTER — APPOINTMENT (OUTPATIENT)
Dept: ORTHOPEDIC SURGERY | Facility: CLINIC | Age: 76
End: 2021-07-16
Payer: MEDICARE

## 2021-07-16 VITALS
DIASTOLIC BLOOD PRESSURE: 77 MMHG | WEIGHT: 195 LBS | HEIGHT: 63 IN | HEART RATE: 62 BPM | BODY MASS INDEX: 34.55 KG/M2 | SYSTOLIC BLOOD PRESSURE: 151 MMHG

## 2021-07-16 PROCEDURE — 99213 OFFICE O/P EST LOW 20 MIN: CPT

## 2021-07-16 PROCEDURE — 99072 ADDL SUPL MATRL&STAF TM PHE: CPT

## 2021-07-16 NOTE — HISTORY OF PRESENT ILLNESS
[3] : a current pain level of 3/10 [Walking] : worsened by walking [NSAIDs] : relieved by nonsteroidal anti-inflammatory drugs [de-identified] : 74 y/o female presenting for follow up for right knee osteoarthritis. She is s/p right total ankle replacement and left knee replacement over 5 years ago. Patient was last seen in May, at which time she had a cortisone injection in the right knee. She states the injection helped her pain, and she is still feeling relief from the shot. She occasionally has some pain, which is well controlled with Meloxicam. She takes it only as needed and does not require it on a daily basis. She denies any numbness or tingling in her legs. In regards to her total ankle and total knee replacements, she reports she is feeling well with no issues.

## 2021-07-16 NOTE — PHYSICAL EXAM
[LE] : Sensory: Intact in bilateral lower extremities [DP] : dorsalis pedis 2+ and symmetric bilaterally [Normal RLE] : Right Lower Extremity: No scars, rashes, lesions, ulcers, skin intact [Normal LLE] : Left Lower Extremity: No scars, rashes, lesions, ulcers, skin intact [Normal] : Oriented to person, place, and time, insight and judgement were intact and the affect was normal [de-identified] : bilateral knee skin clean and intact, no open wounds, no lesions, no erythema, no signs of infection. Left knee incision is well healed.\par No effusion\par ROM: 0-125 degrees bilaterally \par Anterior Drawer Negative\par No Extension Lag\par No laxity with varus/valgus stress\par No joint line tenderness to palpation'\par no crepitus\par compartments soft, non-tender bilaterally\par \par \par \par \par   [de-identified] : No new imaging performed today

## 2021-07-16 NOTE — DISCUSSION/SUMMARY
[de-identified] : 74 y/o female with right knee osteoarthritis. Patient is s/p cortisone injection 2 months ago which is keeping her knee pain well controlled. She can continue taking Meloxicam as needed. She will follow up on an as needed basis.

## 2021-07-19 RX ORDER — MELOXICAM 7.5 MG/1
7.5 TABLET ORAL
Qty: 40 | Refills: 0 | Status: ACTIVE | COMMUNITY
Start: 2021-07-19 | End: 1900-01-01

## 2022-03-29 NOTE — DISCHARGE NOTE ADULT - NS MD DC PLAN IMMU FLU REF OTH
From: Brianna Clement  To: aTlia Goldberg  Sent: 3/29/2022 9:24 AM CDT  Subject: If approved, 4th Covid Vaccine be recommended for  travel?    Dr. Goldberg -   Just curious... I'm planning a trip to [place] on April 18th. If the 4th Covid Vaccine is approved for seniors by the CDC this week, would you recommend I get it due to my upcoming trip? (Of course, I'm also going to check requirements for entry into [place].)    Thanks.  Brianna Clement   Out of season

## 2022-06-17 ENCOUNTER — APPOINTMENT (OUTPATIENT)
Dept: ORTHOPEDIC SURGERY | Facility: CLINIC | Age: 77
End: 2022-06-17

## 2022-06-17 VITALS
HEIGHT: 63 IN | DIASTOLIC BLOOD PRESSURE: 71 MMHG | WEIGHT: 195 LBS | HEART RATE: 68 BPM | SYSTOLIC BLOOD PRESSURE: 128 MMHG | BODY MASS INDEX: 34.55 KG/M2

## 2022-06-17 DIAGNOSIS — Z96.661 PRESENCE OF RIGHT ARTIFICIAL ANKLE JOINT: ICD-10-CM

## 2022-06-17 DIAGNOSIS — Z96.652 PRESENCE OF LEFT ARTIFICIAL KNEE JOINT: ICD-10-CM

## 2022-06-17 PROCEDURE — 99214 OFFICE O/P EST MOD 30 MIN: CPT

## 2022-06-17 PROCEDURE — 73610 X-RAY EXAM OF ANKLE: CPT | Mod: RT

## 2022-06-17 PROCEDURE — 73562 X-RAY EXAM OF KNEE 3: CPT | Mod: 50

## 2022-06-17 RX ORDER — NEBIVOLOL 10 MG/1
10 TABLET ORAL
Qty: 90 | Refills: 0 | Status: ACTIVE | COMMUNITY
Start: 2022-05-12

## 2022-06-17 RX ORDER — ASPIRIN 81 MG/1
81 TABLET, COATED ORAL
Qty: 90 | Refills: 0 | Status: ACTIVE | COMMUNITY
Start: 2022-05-12

## 2022-06-17 RX ORDER — MELOXICAM 15 MG/1
15 TABLET ORAL
Qty: 30 | Refills: 0 | Status: ACTIVE | COMMUNITY
Start: 2021-12-03

## 2022-06-17 NOTE — HISTORY OF PRESENT ILLNESS
[FreeTextEntry1] : Patient is a 76-year-old female who presents today for an evaluation of both knees and right ankle she is status post right ankle replacement with Dr. Michael and left total knee replacement performed by Dr. Michael. She states she's been doing very well with no complaints of any pain or discomfort. She states that she has returned to previous activities without discomfort. She does state that at times she does get some mild swelling and stiffness in the right ankle this occurs usually by the end of the regards to her right knee. She states last office visit was seen one year ago. She did receive cortisone injection. Which he states markedly improved her condition. She denies any buckling locking or swelling.

## 2022-06-17 NOTE — REASON FOR VISIT
[Follow-Up Visit] : a follow-up visit for [FreeTextEntry2] : s/p left TKR 7/19/18. S/P right ankle replacement 5/30/13.

## 2022-06-17 NOTE — PHYSICAL EXAM
[de-identified] : On physical examination of the knee right and left knee. Skin is clean dry and intact with no areas of redness swelling or heat noted. On the left knee there is a well-healed old surgical scar following her total knee replacement. The overall alignment to both knees are in neutral position. She has excellent range of motion both passively and actively. With no evidence of ligamentous laxity. There is no evidence of any motor or sensory deficit. Patient has good distal pulses with no calf tenderness.\par \par On physical examination of the right ankle. Patient is status post right total ankle replacement. There is a well-healed surgical scar with no evidence of infection superficial or deep. She is nontender on examination. There is some decrease in her range of motion both passively and actively with plantar and dorsiflexion when compared to the opposite side. The patient does not elicit any discomfort. She is neurovascularly intact with good distal pulses. Toes are pink and mobile [de-identified] : X-rays of the left knee reveals status post left total knee replacement. Hardware is in place until l to auscultation. There is no evidence of any fracture dislocation or loosening of any hardware.\par \par X-rays of the right knee reveals some mild osteoarthritic changes with narrowing of the joint spacing. This is mostly seen on the medial femoral tibial compartment. There is also some osteophyte formation. No evidence of any fracture or dislocation.\par \par X-rays of the right ankle reveal status post right total ankle replacement. Hardware is in place and shows excellent as well as fixation. There is no evidence of any fracture dislocation or loosening of any hardware.

## 2022-06-17 NOTE — END OF VISIT
[Time Spent: ___ minutes] : I have spent [unfilled] minutes of time on the encounter. [FreeTextEntry3] : I, Dr. Michael, personally performed the evaluation and management services for this established patient who presented today with a new problem/exacerbation of an existing condition. That E/M includes conducting the examination, assessing all new/exacerbated conditions, and establishing a new plan of care. Today, MY ACP was here to observe my evaluation and management services of this new problem/exacerbated condition to be followed going forward.\par

## 2022-06-17 NOTE — DISCUSSION/SUMMARY
[de-identified] : Plan for the patient is to continue with the present level of activities. This includes good home exercise program alone is moist heat and anti-inflammatories. Patient is advised to continue with his conservative measures and to return to the office in another year or so for reevaluation of motion. However if the patient expresses an increase in pain or discomfort to notify the office\par \par I saw and evaluated the patient. I discussed and reviewed the case details with the PA and agree with the PA's findings and plan as documented in the PA note.\par \par

## 2022-09-16 ENCOUNTER — APPOINTMENT (OUTPATIENT)
Dept: ORTHOPEDIC SURGERY | Facility: CLINIC | Age: 77
End: 2022-09-16

## 2022-09-16 VITALS
WEIGHT: 195 LBS | HEIGHT: 63 IN | HEART RATE: 61 BPM | BODY MASS INDEX: 34.55 KG/M2 | SYSTOLIC BLOOD PRESSURE: 154 MMHG | DIASTOLIC BLOOD PRESSURE: 84 MMHG

## 2022-09-16 DIAGNOSIS — M17.11 UNILATERAL PRIMARY OSTEOARTHRITIS, RIGHT KNEE: ICD-10-CM

## 2022-09-16 PROCEDURE — 99213 OFFICE O/P EST LOW 20 MIN: CPT | Mod: 25

## 2022-09-16 PROCEDURE — 73562 X-RAY EXAM OF KNEE 3: CPT | Mod: RT

## 2022-09-16 PROCEDURE — 20610 DRAIN/INJ JOINT/BURSA W/O US: CPT | Mod: RT

## 2022-09-16 NOTE — HISTORY OF PRESENT ILLNESS
[de-identified] : Patient presents today with  for evaluation of right knee pain.  The patient reports of ongoing right knee pain for a number of months.  She is known to have early arthritic changes.  The patient is here today for corticosteroid injection of the right knee.  She is moving to [place] by the end of the month permanently.  She will come back to visit her son as well as for follow-up care for her left knee replacement of right ankle replacement.  She is not using a cane or walker.  She does occasionally experience a buckling-like sensation of the right knee at times.  She is not regularly taking oral pain medication for the right knee pain.\par \par Review of Systems-\par Constitutional: No fever or chills. \par Cardiovascular: No orthopnea or chest pain\par Pulmonary: No shortness of breath. \par GI: No nausea or vomiting or abdominal pain.\par Musculoskeletal: see HPI \par Psychiatric: No anxiety and depression.

## 2022-09-16 NOTE — PROCEDURE
[de-identified] : Procedure: Injection of the right knee. \par Indication:  Osteoarthritis. \par Risk and benefits were discussed with the patient. Potential complications include bleeding and infection. Verbal consent was obtained prior to the procedure. \par Chloraprep was used to prep the area. ethyl chloride spray was used as a topical anesthetic. Using sterile technique, the aspiration/injection needle was then directed from a [default value] aspect was used to inject 5 mL of 1% Lidocaine and 2 mL of 40mg/mL methylprednisolone. A bandage was applied. The patient tolerated the procedure well. \par Complications: none. \par Patient instructed to avoid strenuous activity for 1 day(s). \par Follow-up in the office as needed.

## 2022-09-16 NOTE — REASON FOR VISIT
[Follow-Up Visit] : a follow-up visit for [Other: ____] : [unfilled] [FreeTextEntry2] :  S/P right ankle replacement 5/30/13. \par

## 2022-09-16 NOTE — PHYSICAL EXAM
[de-identified] : The patient is conversive and in no apparent distress. The patient is alert and oriented to person, place, and time. Affect and mood appear normal. The head is normocephalic and atraumatic. Skin shows normal turgor with no evidence of eczema or psoriasis. No respiratory distress noted. Sensation grossly intact. MUSCULOSKELETAL:   SEE BELOW\par \par Right knee exam demonstrates skin that is clean, dry intact.  There is a -3 degree valgus alignment.  No signs of acute trauma.  No large surgical scars.  Normal temperature.  Minimal effusion.  Passive range of motion is 0 degrees of extension to 110 degrees of flexion.  There is moderate patellofemoral crepitus.  There is mild laxity with stress testing.  There is no extensor mechanism lag.  No flexion contracture.  Mild varicose veins.  Mild spider veins.  2+ DP pulse.  Normal distal sensation.  No foot drop. [de-identified] : Right knee x-ray demonstrates a valgus alignment.  No orthopedic hardware.  Moderate to advanced joint space narrowing of the lateral compartment.\par \par

## 2022-09-16 NOTE — DISCUSSION/SUMMARY
[de-identified] : 25 minutes was spent reviewing the x-rays as well as discussing with the patient their clinical presentation, diagnosis and providing education.  Today's x-rays reviewed with the patient.  The patient was shown the degenerative changes of the lateral compartment.  At this time she requested a corticosteroid injection.  This was performed.  The patient will continue to use acetaminophen for mild pain control.  She will avoid anti-inflammatories because of daily anticoagulation.  Instruction education provided.  The patient will be seen back in the future as allowed by her schedule.  All questions answered to the patient's satisfaction.

## 2023-12-11 NOTE — PHYSICAL THERAPY INITIAL EVALUATION ADULT - ASR EQUIP NEEDS DISCH PT EVAL
Called patient and informed her. She states this is not the one she was needing. Patient states that she has sores in her vagina. She states that she has had these before and needs a cream sent in for Herpes. rolling walker (5 inch wheels)

## 2023-12-28 NOTE — DISCHARGE NOTE ADULT - ADDITIONAL INSTRUCTIONS
follow up with Dr. Michael on 8/3 @ 9:45am follow up with Dr. Michael on 8/3 @ 9:45am  pt needs to check Renal function test next week with PCP. abdominal pain

## 2024-03-21 NOTE — H&P PST ADULT - PROBLEM SELECTOR PROBLEM 1
Viktoria Wade is a 85 y.o. female with PMHx of breast cancer, DM, HTN. She was admitted from the ED on 3/14 with left knee pain, dysuria, sacral wound.     Hx obtained from patient's sister. Was at home and had progressive difficulty walking. Noted to have left knee swelling. Do not believe she had blood in her urine. Voids into diaper.     Urine culture from admission MO. Repeat urine culture 3/18 growing yeast.     Had a renal US that shows no hydro. Possible layering debris present in the bladder.     She has been afebrile with stable vitals.    Primary osteoarthritis of left knee

## 2024-07-10 NOTE — DISCHARGE NOTE ADULT - HOME CARE AGENCY
Detail Level: Zone
Plan: The ABCDEs of melanoma were reviewed with the patient, and the importance of monthly self-examination of moles was emphasized. Should any moles change in shape or color, or itch, bleed or burn, patient will contact our office for evaluation sooner than their interval appointment.\\nSun protective clothing is also effective at protecting against UV rays that cause skin cancer.
Initiate Treatment: Sunscreen (SPF 30 or greater) should be applied every 1.5-2 hours, during peak UV exposure (between 10am and 2pm) and reapplied after exercise or swimming.
[facility] - [phone]/ [phone]  Registered Nurse to visit the day after hospital discharge; Physical Therapist to follow. Please contact the home care agency at the above phone number if you have not heard from them by 12 noon on the day after your hospital discharge.